# Patient Record
Sex: FEMALE | Race: BLACK OR AFRICAN AMERICAN | Employment: FULL TIME | URBAN - METROPOLITAN AREA
[De-identification: names, ages, dates, MRNs, and addresses within clinical notes are randomized per-mention and may not be internally consistent; named-entity substitution may affect disease eponyms.]

---

## 2013-07-01 LAB — COLONOSCOPY, EXTERNAL: NORMAL

## 2017-04-04 ENCOUNTER — OFFICE VISIT (OUTPATIENT)
Dept: INTERNAL MEDICINE CLINIC | Age: 52
End: 2017-04-04

## 2017-04-04 VITALS
DIASTOLIC BLOOD PRESSURE: 77 MMHG | HEIGHT: 61 IN | SYSTOLIC BLOOD PRESSURE: 107 MMHG | WEIGHT: 184 LBS | BODY MASS INDEX: 34.74 KG/M2 | HEART RATE: 64 BPM | RESPIRATION RATE: 18 BRPM | TEMPERATURE: 98.2 F | OXYGEN SATURATION: 97 %

## 2017-04-04 DIAGNOSIS — R21 RASH: ICD-10-CM

## 2017-04-04 DIAGNOSIS — J30.1 NON-SEASONAL ALLERGIC RHINITIS DUE TO POLLEN: ICD-10-CM

## 2017-04-04 DIAGNOSIS — R73.01 IFG (IMPAIRED FASTING GLUCOSE): ICD-10-CM

## 2017-04-04 DIAGNOSIS — R71.8 MICROCYTOSIS: ICD-10-CM

## 2017-04-04 DIAGNOSIS — I10 ESSENTIAL HYPERTENSION: Primary | ICD-10-CM

## 2017-04-04 DIAGNOSIS — E78.5 DYSLIPIDEMIA: ICD-10-CM

## 2017-04-04 DIAGNOSIS — E55.9 VITAMIN D DEFICIENCY: ICD-10-CM

## 2017-04-04 DIAGNOSIS — R01.1 MURMUR, CARDIAC: ICD-10-CM

## 2017-04-04 RX ORDER — CETIRIZINE HCL 10 MG
TABLET ORAL
Qty: 30 TAB | Refills: 5 | Status: SHIPPED | OUTPATIENT
Start: 2017-04-04 | End: 2018-06-22 | Stop reason: SDUPTHER

## 2017-04-04 NOTE — MR AVS SNAPSHOT
Visit Information Date & Time Provider Department Dept. Phone Encounter #  
 4/4/2017 12:15 PM Trent Wolf, [address] [place] [address],[address] [phone] [id number] Follow-up Instructions Return in about 6 months (around 10/4/2017). Upcoming Health Maintenance Date Due DTaP/Tdap/Td series (1 - Tdap) 6/15/1985 PAP AKA CERVICAL CYTOLOGY 7/14/2017 COLONOSCOPY 1/14/2018 BREAST CANCER SCRN MAMMOGRAM 10/8/2018 Allergies as of 4/4/2017  Review Complete On: 4/4/2017 By: June Al LPN Severity Noted Reaction Type Reactions Lisinopril Medium 04/01/2013   Intolerance Cough Shellfish Derived  02/04/2014    Hives Current Immunizations  Reviewed on 2/4/2014 Name Date Influenza Vaccine PF 11/11/2013 Influenza Vaccine Split 9/7/2012, 1/21/2011 Not reviewed this visit You Were Diagnosed With   
  
 Codes Comments Essential hypertension    -  Primary ICD-10-CM: I10 
ICD-9-CM: 401.9 Microcytosis     ICD-10-CM: R71.8 ICD-9-CM: 790.09 Dyslipidemia     ICD-10-CM: E78.5 ICD-9-CM: 272.4 Non-seasonal allergic rhinitis due to pollen     ICD-10-CM: J30.1 ICD-9-CM: 477.0 IFG (impaired fasting glucose)     ICD-10-CM: R73.01 
ICD-9-CM: 790.21 Vitamin D deficiency     ICD-10-CM: E55.9 ICD-9-CM: 268.9 Rash     ICD-10-CM: R21 
ICD-9-CM: 782.1 Murmur, cardiac     ICD-10-CM: R01.1 ICD-9-CM: 227. 2 Vitals BP Pulse Temp Resp Height(growth percentile) Weight(growth percentile) 107/77 (BP 1 Location: Left arm, BP Patient Position: Sitting) 64 98.2 °F (36.8 °C) (Oral) 18 5' 1\" (1.549 m) 184 lb (83.5 kg) SpO2 BMI OB Status Smoking Status 97% 34.77 kg/m2 Menopause Never Smoker Vitals History BMI and BSA Data Body Mass Index Body Surface Area 34.77 kg/m 2 1.9 m 2 Preferred Pharmacy Pharmacy Name Phone [facility] [address] [facility] [phone] Your Updated Medication List  
  
   
This list is accurate as of: 4/4/17 12:43 PM.  Always use your most recent med list. amLODIPine 10 mg tablet Commonly known as:  Eaton Jairo TAKE ONE TABLET BY MOUTH ONCE DAILY  
  
 cetirizine 10 mg tablet Commonly known as:  ZYRTEC  
TAKE ONE TABLET BY MOUTH ONCE DAILY AT BEDTIME  
  
 losartan-hydroCHLOROthiazide 100-25 mg per tablet Commonly known as:  HYZAAR  
TAKE ONE TABLET BY MOUTH ONCE DAILY  
  
 melatonin 1 mg tablet Take 1 mg by mouth nightly. MULTIPLE VITAMIN PO Take  by mouth. One daily  
  
 triamcinolone acetonide 0.1 % topical cream  
Commonly known as:  KENALOG Apply  to affected area two (2) times a day. use thin layer mon to Friday only bid Prescriptions Sent to Pharmacy Refills  
 cetirizine (ZYRTEC) 10 mg tablet 5 Sig: TAKE ONE TABLET BY MOUTH ONCE DAILY AT BEDTIME Class: Normal  
 Pharmacy: [address] [place] [address] #: [phone] We Performed the Following CBC W/O DIFF [66692 CPT(R)] HEMOGLOBIN A1C WITH EAG [22863 CPT(R)] LIPID PANEL [33905 CPT(R)] METABOLIC PANEL, COMPREHENSIVE [67955 CPT(R)] REFERRAL TO DERMATOLOGY [REF19 Custom] Comments:  
 Please evaluate patient for rash TSH 3RD GENERATION [18193 CPT(R)] Follow-up Instructions Return in about 6 months (around 10/4/2017). To-Do List   
 04/04/2017 ECHO:  2D ECHO COMPLETE ADULT (TTE) W OR WO CONTR Referral Information Referral ID Referred By Referred To  
  
 [id number] Landy Rodriguez Not Available Visits Status Start Date End Date 1 New Request 4/4/17 4/4/18 If your referral has a status of pending review or denied, additional information will be sent to support the outcome of this decision. Introducing [facility]!    
 [employer] introduces [employer]k patient portal. Now you can access parts of your medical record, email your doctor's office, and request medication refills online. 1. In your internet browser, go to [email or web address] 2. Click on the First Time User? Click Here link in the Sign In box. You will see the New Member Sign Up page. 3. Enter your Barburrito Access Code exactly as it appears below. You will not need to use this code after youve completed the sign-up process. If you do not sign up before the expiration date, you must request a new code. · Barbur[employer] Access Code: [email or web address] Expires: 7/3/2017 12:43 PM 
 
4. Enter the last four digits of your Social Security Number (xxxx) and Date of Birth (mm/dd/yyyy) as indicated and click Submit. You will be taken to the next sign-up page. 5. Create a Barburrito ID. This will be your Barburrito login ID and cannot be changed, so think of one that is secure and easy to remember. 6. Create a Barburrito password. You can change your password at any time. 7. Enter your Password Reset Question and Answer. This can be used at a later time if you forget your password. 8. Enter your e-mail address. You will receive e-mail notification when new information is available in [address] 9. Click Sign Up. You can now view and download portions of your medical record. 10. Click the Download Summary menu link to download a portable copy of your medical information. If you have questions, please visit the Frequently Asked Questions section of the Barbur[employer] website. Remember, Barburrito is NOT to be used for urgent needs. For medical emergencies, dial 911. Now available from your iPhone and Android! Please provide this summary of care documentation to your next provider. Your primary care clinician is listed as Denice Celeste. If you have any questions after today's visit, please call [phone].

## 2017-04-04 NOTE — PROGRESS NOTES
HISTORY OF PRESENT ILLNESS  Rafael Campbell is a 45 y.o. female. HPI   Last here 8/19/16. Pt is here to f/u on chronic conditions  Pt is overdue for follow up  Pt is not fasting today    BP today is great- 107/77  BP at home is \"good\" per pt   Continues norvasc 10mg and losartan-HCTZ 100-25mg daily    Reviewed last labs 8/16  Vit D low, mild anemia  Repeat labs fasting this week     Pt went to Dr. 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 Galileo Dailey in two weeks    Wt is down 6 lbs since last visit   Congratulated her on this  Discussed diet and continued weight loss     Continues zyrtec daily for allergies  Pt has been getting hives frequently  The zyrtec helps improve her hives/rash  She thinks d/t irritants in work environment     Pt has eczema patch to her R shin   She states this has not been improving  She has been using triamcinolone but this spot continues to spread  Discussed f/u with dermatology at this time  Provided her with referral today, she plans to see Dr. Ilda Jovel (derm)    Reviewed mammogram 10/08/16: negative    Continues vit D 2000 units daily OTC     Pt has seen Dr. 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 Still can hear her murmur, ordered this again 4/17.  She will complete this      PREVENTIVE:    Colonoscopy: 7/01/13, Dr. Mickey Reyes, repeat 5 years  Pap: [facility],   Mammogram: 10/08/16 negative  DEXA: not yet needed  Tdap:     Pneumovax: not yet needed  Clusuks48: not yet needed  Zostavax: not yet needed  Flu shot: declines this year   A1c:  5.8, 1/15 5.9,  5.7,  6.0  Eye exam: [facility], 3/15, due, will schedule  Hep C screen: 1/15 negative  EK16, ECHO ordered   Lipids:  ,  ordered         Patient Active Problem List    Diagnosis Date Noted    Murmur 2011    Allergic rhinitis 2010    Hypertension 10/02/2009    Microcytosis 10/02/2009     Current Outpatient Prescriptions   Medication Sig Dispense Refill    losartan-hydroCHLOROthiazide (HYZAAR) 100-25 mg per tablet TAKE ONE TABLET BY MOUTH ONCE DAILY 30 Tab 3    amLODIPine (NORVASC) 10 mg tablet TAKE ONE TABLET BY MOUTH ONCE DAILY 30 Tab 4    triamcinolone acetonide (KENALOG) 0.1 % topical cream Apply  to affected area two (2) times a day. use thin layer mon to Friday only bid 30 g 0    cetirizine (ZYRTEC) 10 mg tablet TAKE ONE TABLET BY MOUTH ONCE DAILY AT BEDTIME 30 Tab 5    melatonin 1 mg tablet Take 1 mg by mouth nightly.  MULTIVITAMINS (MULTIPLE VITAMIN PO) Take  by mouth.  One daily        Past Surgical History:   Procedure Laterality Date    ENDOSCOPY, COLON, DIAGNOSTIC      HX GYN      tubal ligation      Lab Results  Component Value Date/Time   WBC 9.3 2016 10:32 AM   HGB 10.4 2016 10:32 AM   HCT 35.2 2016 10:32 AM   PLATELET 624  10:32 AM   MCV 74 2016 10:32 AM       Lab Results  Component Value Date/Time   Cholesterol, total 199 2016 10:26 AM   HDL Cholesterol 71 2016 10:26 AM   LDL, calculated 107 2016 10:26 AM   Triglyceride 103 2016 10:26 AM       Lab Results  Component Value Date/Time   GFR est AA 91 2016 10:32 AM   GFR est non-AA 79 2016 10:32 AM   Creatinine 0.85 2016 10:32 AM   BUN 10 2016 10:32 AM   Sodium 144 2016 10:32 AM   Potassium 4.3 08/19/2016 10:32 AM   Chloride 101 08/19/2016 10:32 AM   CO2 26 08/19/2016 10:32 AM         Review of Systems   Respiratory: Negative for shortness of breath. Cardiovascular: Negative for chest pain. Skin: Positive for rash. Endo/Heme/Allergies: Positive for environmental allergies. Physical Exam   Constitutional: She is oriented to person, place, and time. She appears well-developed and well-nourished. No distress. HENT:   Head: Normocephalic and atraumatic. Right Ear: External ear normal.   Mouth/Throat: Oropharynx is clear and moist. No oropharyngeal exudate. Mild cerumen L ear   Eyes: Conjunctivae and EOM are normal. Right eye exhibits no discharge. Left eye exhibits no discharge. Neck: Normal range of motion. Neck supple. Cardiovascular: Normal rate, regular rhythm and intact distal pulses. Exam reveals no gallop and no friction rub. Murmur heard. Pulmonary/Chest: Effort normal and breath sounds normal. No respiratory distress. She has no wheezes. She has no rales. She exhibits no tenderness. Musculoskeletal: Normal range of motion. She exhibits no edema, tenderness or deformity. Lymphadenopathy:     She has no cervical adenopathy. Neurological: She is alert and oriented to person, place, and time. Coordination normal.   Skin: Skin is warm and dry. Rash noted. She is not diaphoretic. No erythema. No pallor. Large purplish colored plaque R calf   Psychiatric: She has a normal mood and affect. Her behavior is normal.       ASSESSMENT and PLAN    ICD-10-CM ICD-9-CM    1. Essential hypertension    BP well controlled on norvasc 10mg and losartan HCTZ 20-25mg. Continue no change to dose. I10 401.9 CBC W/O DIFF      HEMOGLOBIN A1C WITH EAG      METABOLIC PANEL, COMPREHENSIVE      LIPID PANEL      TSH 3RD GENERATION   2.  Microcytosis    Chronic, will get old notes from Dr. Huma Ordonez for review, repeat CBC today R71.8 790.09 CBC W/O DIFF      HEMOGLOBIN A1C WITH EAG      METABOLIC PANEL, COMPREHENSIVE      LIPID PANEL      TSH 3RD GENERATION   3. Dyslipidemia    Diet controlled, wt improved, repeat lipids E78.5 272.4 CBC W/O DIFF      HEMOGLOBIN A1C WITH EAG      METABOLIC PANEL, COMPREHENSIVE      LIPID PANEL      TSH 3RD GENERATION   4. Non-seasonal allergic rhinitis due to pollen    Improved with zyrtec, continue. J30.1 477.0 CBC W/O DIFF      HEMOGLOBIN A1C WITH EAG      METABOLIC PANEL, COMPREHENSIVE      LIPID PANEL      TSH 3RD GENERATION   5. IFG (impaired fasting glucose)    Wt improved, a1c should be improved as well, repeat fasting labs, continue with diet, no medications needed. R73.01 790.21 CBC W/O DIFF      HEMOGLOBIN A1C WITH EAG      METABOLIC PANEL, COMPREHENSIVE      LIPID PANEL      TSH 3RD GENERATION   6. Vitamin D deficiency    Taking OTC 2000 units per day, repeat level   E55.9 268.9    7. Rash    Refer back to dermatology, not improved with triamcinolone   R21 782.1 REFERRAL TO DERMATOLOGY   8. Murmur, cardiac    Check ECHO R01.1 785.2 2D ECHO COMPLETE ADULT (TTE) W OR WO CONTR            Written by Geeta Branch, as dictated by Bradford Wood MD.    Current diagnosis and concerns discussed with pt at length. Understands risks and benefits or current treatment plan and medications and accepts the treatment and medication with any possible risks.   Pt asks appropriate questions which were answered.   Pt instructed to call with any concerns or problems.

## 2017-04-18 ENCOUNTER — HOSPITAL ENCOUNTER (OUTPATIENT)
Dept: NON INVASIVE DIAGNOSTICS | Age: 52
Discharge: HOME | End: 2017-04-18
Attending: INTERNAL MEDICINE
Payer: COMMERCIAL

## 2017-04-18 DIAGNOSIS — R01.1 MURMUR, CARDIAC: ICD-10-CM

## 2017-04-18 PROCEDURE — 93306 TTE W/DOPPLER COMPLETE: CPT

## 2017-04-20 NOTE — PROGRESS NOTES
Called, spoke to pt. Two pt identifiers confirmed. Pt informed per Dr. Krystal Knapp echo shows normal heart function and valves. Pt verbalized understanding of information discussed w/ no further questions at this time.

## 2017-10-16 ENCOUNTER — OFFICE VISIT (OUTPATIENT)
Dept: INTERNAL MEDICINE CLINIC | Age: 52
End: 2017-10-16

## 2017-10-16 VITALS
TEMPERATURE: 97.9 F | BODY MASS INDEX: 34.74 KG/M2 | RESPIRATION RATE: 16 BRPM | HEART RATE: 70 BPM | DIASTOLIC BLOOD PRESSURE: 62 MMHG | WEIGHT: 184 LBS | OXYGEN SATURATION: 96 % | HEIGHT: 61 IN | SYSTOLIC BLOOD PRESSURE: 100 MMHG

## 2017-10-16 DIAGNOSIS — R73.01 IFG (IMPAIRED FASTING GLUCOSE): ICD-10-CM

## 2017-10-16 DIAGNOSIS — I10 ESSENTIAL HYPERTENSION: ICD-10-CM

## 2017-10-16 DIAGNOSIS — Z00.00 PHYSICAL EXAM, ANNUAL: Primary | ICD-10-CM

## 2017-10-16 NOTE — MR AVS SNAPSHOT
Visit Information Date & Time Provider Department Dept. Phone Encounter #  
 10/16/2017  9:00 AM Zoe Colunga, [address] [place] [address],[address] [phone] [id number] Follow-up Instructions Return in about 6 months (around 4/16/2018). Upcoming Health Maintenance Date Due DTaP/Tdap/Td series (1 - Tdap) 6/15/1985 COLONOSCOPY 1/14/2018 BREAST CANCER SCRN MAMMOGRAM 10/8/2018 PAP AKA CERVICAL CYTOLOGY 9/12/2019 Allergies as of 10/16/2017  Review Complete On: 4/4/2017 By: Zoe Colunga MD  
  
 Severity Noted Reaction Type Reactions Lisinopril Medium 04/01/2013   Intolerance Cough Shellfish Derived  02/04/2014    Hives Current Immunizations  Reviewed on 2/4/2014 Name Date Influenza Vaccine PF 11/11/2013 Influenza Vaccine Split 9/7/2012, 1/21/2011 Tdap 1/1/2010 Not reviewed this visit You Were Diagnosed With   
  
 Codes Comments Physical exam, annual    -  Primary ICD-10-CM: Z00.00 ICD-9-CM: V70.0 Essential hypertension     ICD-10-CM: I10 
ICD-9-CM: 401.9 IFG (impaired fasting glucose)     ICD-10-CM: R73.01 
ICD-9-CM: 790.21 Vitals OB Status Smoking Status Menopause Never Smoker Preferred Pharmacy Pharmacy Name Phone [facility] [address] [place] [address] [phone] Your Updated Medication List  
  
   
This list is accurate as of: 10/16/17  9:20 AM.  Always use your most recent med list. amLODIPine 10 mg tablet Commonly known as:  Horris Bills TAKE ONE TABLET BY MOUTH ONCE DAILY  
  
 cetirizine 10 mg tablet Commonly known as:  ZYRTEC  
TAKE ONE TABLET BY MOUTH ONCE DAILY AT BEDTIME  
  
 losartan-hydroCHLOROthiazide 100-25 mg per tablet Commonly known as:  HYZAAR  
TAKE ONE TABLET BY MOUTH ONCE DAILY  
  
 melatonin 1 mg tablet Take 1 mg by mouth nightly. MULTIPLE VITAMIN PO Take  by mouth. One daily triamcinolone acetonide 0.1 % topical cream  
Commonly known as:  KENALOG Apply  to affected area two (2) times a day. use thin layer mon to Friday only bid We Performed the Following CBC W/O DIFF [79145 CPT(R)] HEMOGLOBIN A1C WITH EAG [45802 CPT(R)] LIPID PANEL [50775 CPT(R)] METABOLIC PANEL, COMPREHENSIVE [48738 CPT(R)] TSH 3RD GENERATION [40346 CPT(R)] VITAMIN D, 25 HYDROXY [id number] CPT(R)] Follow-up Instructions Return in about 6 months (around 4/16/2018). To-Do List   
 10/16/2017 Imaging:  [facility] BI SCREENING INCL CAD Patient Instructions Schedule eye exam and mammogram 
 
Labs today And gyn Introducing [facility]! [facility] introduces DailyTicket patient portal. Now you can access parts of your medical record, email your doctor's office, and request medication refills online. 1. In your internet browser, go to [email or web address] 2. Click on the First Time User? Click Here link in the Sign In box. You will see the New Member Sign Up page. 3. Enter your DailyTicket Access Code exactly as it appears below. You will not need to use this code after youve completed the sign-up process. If you do not sign up before the expiration date, you must request a new code. · DailyTicket Access Code: [email or web address] Expires: 1/14/2018  9:20 AM 
 
4. Enter the last four digits of your Social Security Number (xxxx) and Date of Birth (mm/dd/yyyy) as indicated and click Submit. You will be taken to the next sign-up page. 5. Create a DailyTicket ID. This will be your DailyTicket login ID and cannot be changed, so think of one that is secure and easy to remember. 6. Create a DailyTicket password. You can change your password at any time. 7. Enter your Password Reset Question and Answer. This can be used at a later time if you forget your password. 8. Enter your e-mail address.  You will receive e-mail notification when new information is available in [employer]. 9. Click Sign Up. You can now view and download portions of your medical record. 10. Click the Download Summary menu link to download a portable copy of your medical information. If you have questions, please visit the Frequently Asked Questions section of the [employer] website. Remember, [employer] is NOT to be used for urgent needs. For medical emergencies, dial 911. Now available from your iPhone and Android! Please provide this summary of care documentation to your next provider. Your primary care clinician is listed as [facility]. If you have any questions after today's visit, please call [phone].

## 2017-10-16 NOTE — PROGRESS NOTES
HISTORY OF PRESENT ILLNESS  Alber Lagunas is a 47 y.o. female. HPI   Last here 17. Pt is here to f/u on chronic conditions. Pt is not fasting today.     BP today is 100/62  BP at home running around 140s/70s in the PM and 117/70 in the AM  Continues on norvasc 10mg and losartan-HCTZ 100-25mg daily    Wt is stable since last visit   Pt is walking her new dog regularly  Pt is drinking sweet teas regularly   Stopped drinking sodas  Discussed reducing caloric beverage intake  Discussed diet and weight loss     Pt c/o R shoulder and hip pain d/t arthritis  Pt is walking with her new dog     Will get labs today     Pt follows with Dr. Terrell Douglas (ENT) prn  Last visit was 2017  Per pt, pt has a \"long uvula\"  Pt was told that surgery is not recommended at this time  Pt was told to gargle with salt water  Will see him prn    Continues on zyrtec daily for allergies     Continues on vit D 2000 units daily OTC      Pt previously followed with Dr. 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 Still can hear her murmur, ordered this again .  She will complete this    PREVENTIVE:    Colonoscopy: 13, Dr. Joe Llamas, repeat 5 years  Pap: [facility], , at least every other year, due, advised to schedule  Mammogram: 10/08/16 negative, due 10/17, ordered  DEXA: not yet needed  Tdap:     Pneumovax: not yet needed  [facility]: not yet needed  Zostavax: not yet needed  Flu shot: declines    A1c:  5.8, 1/15 5.9,  5.7,  6.0  Eye exam: [facility], 3/15, due, advised to schedule  Hep C screen: 1/15 negative  EK16, ECHO in 2017  Lipids: 2/16        Patient Active Problem List    Diagnosis Date Noted    Murmur 05/27/2011    Allergic rhinitis 02/05/2010    Hypertension 10/02/2009    Microcytosis 10/02/2009     Current Outpatient Prescriptions   Medication Sig Dispense Refill    losartan-hydroCHLOROthiazide (HYZAAR) 100-25 mg per tablet TAKE ONE TABLET BY MOUTH ONCE DAILY 30 Tab 6    amLODIPine (NORVASC) 10 mg tablet TAKE ONE TABLET BY MOUTH ONCE DAILY 30 Tab 6    cetirizine (ZYRTEC) 10 mg tablet TAKE ONE TABLET BY MOUTH ONCE DAILY AT BEDTIME 30 Tab 5    triamcinolone acetonide (KENALOG) 0.1 % topical cream Apply  to affected area two (2) times a day. use thin layer mon to Friday only bid 30 g 0    melatonin 1 mg tablet Take 1 mg by mouth nightly.  MULTIVITAMINS (MULTIPLE VITAMIN PO) Take  by mouth. One daily        Past Surgical History:   Procedure Laterality Date    ENDOSCOPY, COLON, DIAGNOSTIC      HX GYN      tubal ligation      Lab Results  Component Value Date/Time   WBC 9.3 08/19/2016 10:32 AM   HGB 10.4 08/19/2016 10:32 AM   HCT 35.2 08/19/2016 10:32 AM   PLATELET 340 80/23/7543 10:32 AM   MCV 74 08/19/2016 10:32 AM     Lab Results  Component Value Date/Time   Cholesterol, total 199 02/16/2016 10:26 AM   HDL Cholesterol 71 02/16/2016 10:26 AM   LDL, calculated 107 02/16/2016 10:26 AM   Triglyceride 103 02/16/2016 10:26 AM     Lab Results  Component Value Date/Time   GFR est non-AA 79 08/19/2016 10:32 AM   GFR est AA 91 08/19/2016 10:32 AM   Creatinine 0.85 08/19/2016 10:32 AM   BUN 10 08/19/2016 10:32 AM   Sodium 144 08/19/2016 10:32 AM   Potassium 4.3 08/19/2016 10:32 AM   Chloride 101 08/19/2016 10:32 AM   CO2 26 08/19/2016 10:32 AM          Review of Systems   Respiratory: Negative for shortness of breath. Cardiovascular: Negative for chest pain. Physical Exam   Constitutional: She is oriented to person, place, and time. She appears well-developed and well-nourished. No distress.    HENT:   Head: Normocephalic and atraumatic. Right Ear: External ear normal.   Left Ear: External ear normal.   Mouth/Throat: Oropharynx is clear and moist. No oropharyngeal exudate. Eyes: Conjunctivae and EOM are normal. Pupils are equal, round, and reactive to light. Right eye exhibits no discharge. Left eye exhibits no discharge. No scleral icterus. Neck: Normal range of motion. Neck supple. No thyromegaly present. No carotid bruits     Cardiovascular: Normal rate, regular rhythm and intact distal pulses. Exam reveals no gallop and no friction rub. Murmur (Tiny, R-sternal border) heard. Pulmonary/Chest: Effort normal and breath sounds normal. No respiratory distress. She has no wheezes. She has no rales. She exhibits no tenderness. Abdominal: Soft. She exhibits no distension and no mass. There is no tenderness. There is no rebound and no guarding. Musculoskeletal: Normal range of motion. She exhibits no edema, tenderness or deformity. Lymphadenopathy:     She has no cervical adenopathy. Neurological: She is alert and oriented to person, place, and time. She has normal reflexes. She exhibits normal muscle tone. Coordination normal.   Skin: Skin is warm and dry. No rash noted. She is not diaphoretic. No erythema. No pallor. Psychiatric: She has a normal mood and affect. Her behavior is normal.       ASSESSMENT and PLAN    ICD-10-CM ICD-9-CM    1. Physical exam, annual    Discussed diet and weight loss, due for labs, she will go today, eye exam, mammogram and pelvic are all due, COLO in the Summer Z00.00 V70.0 LIPID PANEL      METABOLIC PANEL, COMPREHENSIVE      CBC W/O DIFF      HEMOGLOBIN A1C WITH EAG      TSH 3RD GENERATION      VITAMIN D, 25 HYDROXY      [facility] BI SCREENING INCL CAD   2. Essential hypertension    Well-controlled on norvasc and losartan-HCTZ, no orthostatic sx, continue current dosing   I10 401.9    3.  IFG (impaired fasting glucose)    Due for a1c, wt is not improved, discussed need for w/l, she needs to cut out sweet teas, she is working on increasing her exercise as she has a new dog R73.01 790.21         Scribed by Toma Abraham of [address] [place] [address] [address], as dictated by Dr. Diogo Kimball. Current diagnosis and concerns discussed with pt at length. Pt understands risks and benefits or current treatment plan and medications, and accepts the treatment and medication with any possible risks. Pt asks appropriate questions, which were answered. Pt was instructed to call with any concerns or problems. This note will not be viewable in [address]

## 2017-10-17 LAB
25(OH)D3+25(OH)D2 SERPL-MCNC: 28.4 NG/ML (ref 30–100)
ALBUMIN SERPL-MCNC: 4.4 G/DL (ref 3.5–5.5)
ALBUMIN/GLOB SERPL: 1.5 {RATIO} (ref 1.2–2.2)
ALP SERPL-CCNC: 86 IU/L (ref 39–117)
ALT SERPL-CCNC: 16 IU/L (ref 0–32)
AST SERPL-CCNC: 20 IU/L (ref 0–40)
BILIRUB SERPL-MCNC: 0.4 MG/DL (ref 0–1.2)
BUN SERPL-MCNC: 12 MG/DL (ref 6–24)
BUN/CREAT SERPL: 14 (ref 9–23)
CALCIUM SERPL-MCNC: 9.7 MG/DL (ref 8.7–10.2)
CHLORIDE SERPL-SCNC: 98 MMOL/L (ref 96–106)
CHOLEST SERPL-MCNC: 190 MG/DL (ref 100–199)
CO2 SERPL-SCNC: 25 MMOL/L (ref 18–29)
CREAT SERPL-MCNC: 0.85 MG/DL (ref 0.57–1)
ERYTHROCYTE [DISTWIDTH] IN BLOOD BY AUTOMATED COUNT: 16.9 % (ref 12.3–15.4)
EST. AVERAGE GLUCOSE BLD GHB EST-MCNC: 111 MG/DL
GLOBULIN SER CALC-MCNC: 3 G/DL (ref 1.5–4.5)
GLUCOSE SERPL-MCNC: 110 MG/DL (ref 65–99)
HBA1C MFR BLD: 5.5 % (ref 4.8–5.6)
HCT VFR BLD AUTO: 35.6 % (ref 34–46.6)
HDLC SERPL-MCNC: 60 MG/DL
HGB BLD-MCNC: 11.4 G/DL (ref 11.1–15.9)
LDLC SERPL CALC-MCNC: 111 MG/DL (ref 0–99)
MCH RBC QN AUTO: 22.7 PG (ref 26.6–33)
MCHC RBC AUTO-ENTMCNC: 32 G/DL (ref 31.5–35.7)
MCV RBC AUTO: 71 FL (ref 79–97)
PLATELET # BLD AUTO: 415 X10E3/UL (ref 150–379)
POTASSIUM SERPL-SCNC: 3.9 MMOL/L (ref 3.5–5.2)
PROT SERPL-MCNC: 7.4 G/DL (ref 6–8.5)
RBC # BLD AUTO: 5.03 X10E6/UL (ref 3.77–5.28)
SODIUM SERPL-SCNC: 139 MMOL/L (ref 134–144)
TRIGL SERPL-MCNC: 93 MG/DL (ref 0–149)
TSH SERPL DL<=0.005 MIU/L-ACNC: 2.5 UIU/ML (ref 0.45–4.5)
VLDLC SERPL CALC-MCNC: 19 MG/DL (ref 5–40)
WBC # BLD AUTO: 8.4 X10E3/UL (ref 3.4–10.8)

## 2017-11-07 ENCOUNTER — HOSPITAL ENCOUNTER (OUTPATIENT)
Dept: MAMMOGRAPHY | Age: 52
Discharge: HOME | End: 2017-11-07
Attending: INTERNAL MEDICINE
Payer: COMMERCIAL

## 2017-11-07 DIAGNOSIS — Z00.00 PHYSICAL EXAM, ANNUAL: ICD-10-CM

## 2017-11-07 PROCEDURE — 77067 SCR MAMMO BI INCL CAD: CPT

## 2017-11-10 ENCOUNTER — HOSPITAL ENCOUNTER (OUTPATIENT)
Dept: ULTRASOUND IMAGING | Age: 52
Discharge: HOME | End: 2017-11-10
Attending: INTERNAL MEDICINE
Payer: COMMERCIAL

## 2017-11-10 ENCOUNTER — HOSPITAL ENCOUNTER (OUTPATIENT)
Dept: MAMMOGRAPHY | Age: 52
Discharge: HOME | End: 2017-11-10
Attending: INTERNAL MEDICINE
Payer: COMMERCIAL

## 2017-11-10 DIAGNOSIS — R92.8 ABNORMAL MAMMOGRAM OF RIGHT BREAST: ICD-10-CM

## 2017-11-10 PROCEDURE — 77065 DX MAMMO INCL CAD UNI: CPT

## 2017-11-10 PROCEDURE — 76642 ULTRASOUND BREAST LIMITED: CPT

## 2018-03-10 RX ORDER — AMLODIPINE BESYLATE 10 MG/1
TABLET ORAL
Qty: 30 TAB | Refills: 6 | Status: SHIPPED | OUTPATIENT
Start: 2018-03-10 | End: 2018-11-05 | Stop reason: SDUPTHER

## 2018-03-24 RX ORDER — LOSARTAN POTASSIUM AND HYDROCHLOROTHIAZIDE 25; 100 MG/1; MG/1
TABLET ORAL
Qty: 30 TAB | Refills: 6 | Status: SHIPPED | OUTPATIENT
Start: 2018-03-24 | End: 2018-10-23 | Stop reason: SDUPTHER

## 2018-04-16 ENCOUNTER — OFFICE VISIT (OUTPATIENT)
Dept: INTERNAL MEDICINE CLINIC | Age: 53
End: 2018-04-16

## 2018-04-16 VITALS
TEMPERATURE: 98.1 F | OXYGEN SATURATION: 96 % | RESPIRATION RATE: 16 BRPM | HEART RATE: 73 BPM | SYSTOLIC BLOOD PRESSURE: 121 MMHG | DIASTOLIC BLOOD PRESSURE: 79 MMHG | BODY MASS INDEX: 36.06 KG/M2 | WEIGHT: 191 LBS | HEIGHT: 61 IN

## 2018-04-16 DIAGNOSIS — E78.5 DYSLIPIDEMIA: ICD-10-CM

## 2018-04-16 DIAGNOSIS — R73.01 IFG (IMPAIRED FASTING GLUCOSE): ICD-10-CM

## 2018-04-16 DIAGNOSIS — I10 ESSENTIAL HYPERTENSION: Primary | ICD-10-CM

## 2018-04-16 DIAGNOSIS — R71.8 MICROCYTOSIS: ICD-10-CM

## 2018-04-16 DIAGNOSIS — M54.41 CHRONIC BILATERAL LOW BACK PAIN WITH BILATERAL SCIATICA: ICD-10-CM

## 2018-04-16 DIAGNOSIS — R92.8 ABNORMALITY OF LEFT BREAST ON SCREENING MAMMOGRAM: ICD-10-CM

## 2018-04-16 DIAGNOSIS — Z12.11 COLON CANCER SCREENING: ICD-10-CM

## 2018-04-16 DIAGNOSIS — G89.29 CHRONIC BILATERAL LOW BACK PAIN WITH BILATERAL SCIATICA: ICD-10-CM

## 2018-04-16 DIAGNOSIS — E66.9 OBESITY (BMI 30.0-34.9): ICD-10-CM

## 2018-04-16 DIAGNOSIS — M54.42 CHRONIC BILATERAL LOW BACK PAIN WITH BILATERAL SCIATICA: ICD-10-CM

## 2018-04-16 DIAGNOSIS — M54.42 ACUTE BACK PAIN WITH SCIATICA, LEFT: Primary | ICD-10-CM

## 2018-04-16 RX ORDER — GABAPENTIN 100 MG/1
100 CAPSULE ORAL 3 TIMES DAILY
Qty: 30 CAP | Refills: 1 | Status: SHIPPED | OUTPATIENT
Start: 2018-04-16 | End: 2018-10-30 | Stop reason: SDUPTHER

## 2018-04-16 NOTE — PROGRESS NOTES
HISTORY OF PRESENT ILLNESS  Michelle Riedel is a 47 y.o. female. HPI   Last here 10/16/17. Pt is here to f/u on chronic conditions.      BP today is 121/79  Continues on norvasc 10mg and losartan-HCTZ 100-25mg daily     Wt is up 7 lbs since last visit   Pt walks her dog twice a day  Pt is no longer drinking sodas  Pt is still drinking 3-4 containers/day of sweet tea   Pt wonders if she can drink tea sweetened with agave instead   Discussed decreasing caloric beverage and increasing water intake      Reviewed labs 10/17: low vit D  Will get labs today     Reviewed mammo 11/17: 1. Presumed isoechoic lymph node in the right breast. No mammographic or ultrasound evidence for malignancy. 2. A six-month follow-up right breast mammogram is recommended to document stability 3. The patient has been notified of these results and recommendations. 4. BI-RADS Category 3, probably benign, short-term follow-up recommended. Reviewed US L breast 11/17: 1. Presumed isoechoic lymph node in the right breast. No mammographic or ultrasound evidence for malignancy. 2. A six-month follow-up right breast mammogram is recommended to document stability 3. The patient has been notified of these results and recommendations. 4. BI-RADS Category 3, probably benign, short-term follow-up recommended.   Discussed 6 month f/u     Pt c/o back pain, radiating to her BLE (mainly to her RLE) x 4 years - worsening   Pt states that her sx exacerbate when sitting down qhs  Pt tried OTC meds with no improvement to sx  Pt used her son's motrin 800mg with improvement to xs  Pt would like an XR L spine  Ordered XR L spine  Ordered gabapentin qhs  Provided her with exercises to complete at home  If her sx progress, would send her to PT    Pt follows with Dr. Parvin Crawley (ENT) prn  Last visit was 4/2017  Pt will only f/u with this physician prn     Pt previously followed with Dr. 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 Kalpana Mercado (derm)  Pt was provided with a cream, which improved her dry skin     Continues on zyrtec daily for allergies      Continues on vit D OTC 2000U daily     Pt wonders if she needs immunizations before going to [facility]  Pt has had a Hep A series in the past      Recall ECHO 17: heart squeeze nl, no MR     PREVENTIVE:    Colonoscopy: 13, Dr. Vipul Weinstein, repeat 5 years, due , referred  Pap: Dr. Lorena Cummings, , at least every other year   Mammogram: , mammo and US L breast, repeat in 6 months, due , ordered   DEXA: not yet needed  Tdap:     Pneumovax: not yet needed  Daaitaq86: not yet needed  Zostavax: not yet needed  Flu shot: declines    A1c:  5.8, 1/15 5.9,  5.7,  6.0, 10/17 5.5  Eye exam: [facility], 3/15, due, advised to schedule, reminded   Hep C screen: 1/15, negative  EK16, nsr   Lipids: 10/17     Patient Active Problem List    Diagnosis Date Noted    Murmur 2011    Allergic rhinitis 2010    Hypertension 10/02/2009    Microcytosis 10/02/2009     Current Outpatient Prescriptions   Medication Sig Dispense Refill    losartan-hydroCHLOROthiazide (HYZAAR) 100-25 mg per tablet TAKE ONE TABLET BY MOUTH ONCE DAILY 30 Tab 6    amLODIPine (NORVASC) 10 mg tablet TAKE ONE TABLET BY MOUTH ONCE DAILY 30 Tab 6    cetirizine (ZYRTEC) 10 mg tablet TAKE ONE TABLET BY MOUTH ONCE DAILY AT BEDTIME 30 Tab 5    triamcinolone acetonide (KENALOG) 0.1 % topical cream Apply  to affected area two (2) times a day. use thin layer mon to Friday only bid 30 g 0    melatonin 1 mg tablet Take 1 mg by mouth nightly.  MULTIVITAMINS (MULTIPLE VITAMIN PO) Take  by mouth.  One daily        Past Surgical History:   Procedure Laterality Date    ENDOSCOPY, COLON, DIAGNOSTIC      HX GYN      tubal ligation      Lab Results  Component Value Date/Time   WBC 8.4 10/16/2017 09:30 AM   HGB 11.4 10/16/2017 09:30 AM   HCT 35.6 10/16/2017 09:30 AM   PLATELET 564 (H) 22/95/2833 09:30 AM   MCV 71 (L) 10/16/2017 09:30 AM     Lab Results  Component Value Date/Time   Cholesterol, total 190 10/16/2017 09:30 AM   HDL Cholesterol 60 10/16/2017 09:30 AM   LDL, calculated 111 (H) 10/16/2017 09:30 AM   Triglyceride 93 10/16/2017 09:30 AM     Lab Results  Component Value Date/Time   GFR est non-AA 78 10/16/2017 09:30 AM   GFR est AA 90 10/16/2017 09:30 AM   Creatinine 0.85 10/16/2017 09:30 AM   BUN 12 10/16/2017 09:30 AM   Sodium 139 10/16/2017 09:30 AM   Potassium 3.9 10/16/2017 09:30 AM   Chloride 98 10/16/2017 09:30 AM   CO2 25 10/16/2017 09:30 AM        Review of Systems   Respiratory: Negative for shortness of breath. Cardiovascular: Negative for chest pain. Musculoskeletal: Positive for back pain. Physical Exam   Constitutional: She is oriented to person, place, and time. She appears well-developed and well-nourished. No distress. HENT:   Head: Normocephalic and atraumatic. Mouth/Throat: Oropharynx is clear and moist. No oropharyngeal exudate. Eyes: Conjunctivae and EOM are normal. Right eye exhibits no discharge. Left eye exhibits no discharge. Neck: Normal range of motion. Neck supple. Cardiovascular: Normal rate, regular rhythm and normal heart sounds. Exam reveals no gallop and no friction rub. No murmur heard. Pulmonary/Chest: Effort normal and breath sounds normal. No respiratory distress. She has no wheezes. She has no rales. She exhibits no tenderness. Musculoskeletal: Normal range of motion. She exhibits tenderness (Mild over R lower spine). She exhibits no edema or deformity. Negative SLR BLE  5/5 strength BLE   Lymphadenopathy:     She has no cervical adenopathy. Neurological: She is alert and oriented to person, place, and time. Coordination normal.   Skin: Skin is warm and dry. No rash noted. She is not diaphoretic. No erythema. No pallor.    Psychiatric: She has a normal mood and affect. Her behavior is normal.       ASSESSMENT and PLAN    ICD-10-CM ICD-9-CM    1. Essential hypertension    Controled on norvasc and losartan-HCTZ, continue, no change to dose    F74 422.5 METABOLIC PANEL, COMPREHENSIVE      HEMOGLOBIN A1C WITH EAG   2. Microcytosis    Prev followed with Dr. Jessica Maza, CBCs have been stable, will monitor    V74.1 708.08 METABOLIC PANEL, COMPREHENSIVE      HEMOGLOBIN A1C WITH EAG   3. IFG (impaired fasting glucose)    Check a1c, last a1c was nl, continue with diet and w/l   W88.66 113.86 METABOLIC PANEL, COMPREHENSIVE      HEMOGLOBIN A1C WITH EAG   4. Dyslipidemia    Diet-controled, continue with diet and w/l   P80.4 100.4 METABOLIC PANEL, COMPREHENSIVE      HEMOGLOBIN A1C WITH EAG   5. Obesity (BMI 30.0-34. 9)    Discussed diet and w/l, pt has been drinking a significant amount of sweet tea each day, needs to decrease sweet tea consumption   N83.0 365.48 METABOLIC PANEL, COMPREHENSIVE      HEMOGLOBIN A1C WITH EAG   6. Abnormality of left breast on screening mammogram    Needs a repeat mammo next month, ordered   R92.8 793.80 [facility] LT DX INCL CAD      7. Low back pain - ordered XR L spine, ordered gabapentin qhs, provided her with exercises to complete at home, if her sx progress would send her to PT or perhaps complete an MRI L spine    8. Colon cancer screening - screen    Depression screen reviewed and negative. Scribed by Esthela Kellogg of [address] [place] [address] [address], as dictated by Dr. Yakov Quarles. Current diagnosis and concerns discussed with pt at length. Pt understands risks and benefits or current treatment plan and medications, and accepts the treatment and medication with any possible risks. Pt asks appropriate questions, which were answered. Pt was instructed to call with any concerns or problems. This note will not be viewable in [address]

## 2018-04-16 NOTE — LETTER
10/31/2018 6:48 AM 
 
Ms. Zain Van [address] [place] [id number] Dear Zain Van: 
 
Please find your most recent results below. Resulted Orders METABOLIC PANEL, COMPREHENSIVE Result Value Ref Range Glucose 106 (H) 65 - 99 mg/dL BUN 13 6 - 24 mg/dL Creatinine 0.80 0.57 - 1.00 mg/dL GFR est non-AA 84 >59 mL/min/1.73 GFR est AA 97 >59 mL/min/1.73  
 BUN/Creatinine ratio 16 9 - 23 Sodium 142 134 - 144 mmol/L Potassium 3.7 3.5 - 5.2 mmol/L Chloride 98 96 - 106 mmol/L  
 CO2 27 20 - 29 mmol/L Calcium 9.8 8.7 - 10.2 mg/dL Protein, total 7.7 6.0 - 8.5 g/dL Albumin 4.7 3.5 - 5.5 g/dL GLOBULIN, TOTAL 3.0 1.5 - 4.5 g/dL A-G Ratio 1.6 1.2 - 2.2 Bilirubin, total 0.4 0.0 - 1.2 mg/dL Alk. phosphatase 80 39 - 117 IU/L  
 AST (SGOT) 18 0 - 40 IU/L  
 ALT (SGPT) 19 0 - 32 IU/L Narrative Performed at:  [address]  [id number] : Prabhu Montano MD, Phone:  [phone] HEMOGLOBIN A1C WITH EAG Result Value Ref Range Hemoglobin A1c 5.7 (H) 4.8 - 5.6 % Comment:  
            Prediabetes: 5.7 - 6.4 Diabetes: >6.4 Glycemic control for adults with diabetes: <7.0 Estimated average glucose 117 mg/dL Narrative Performed at:  [address]  [id number] : Prabhu Montano MD, Phone:  [phone] RECOMMENDATIONS: 
None. Keep up the good work! Please call me if you have any questions: [phone] Sincerely, 
 
 
Harmony Sanders MD

## 2018-04-16 NOTE — MR AVS SNAPSHOT
[facility] [address] [facility] [address] [facility] 
[phone] Patient: Jaden Tang MRN:  [id number] Visit Information Date & Time Provider Department Dept. Phone Encounter #  
 4/16/2018  3:00 PM Alec Castellon, [address] [place] [address],[address] [phone] [id number] Follow-up Instructions Return in about 6 months (around 10/16/2018). Upcoming Health Maintenance Date Due COLONOSCOPY 1/14/2018 PAP AKA CERVICAL CYTOLOGY 9/12/2019 BREAST CANCER SCRN MAMMOGRAM 11/10/2019 DTaP/Tdap/Td series (2 - Td) 1/1/2020 Allergies as of 4/16/2018  Review Complete On: 4/16/2018 By: Mirella Neely LPN Severity Noted Reaction Type Reactions Lisinopril Medium 04/01/2013   Intolerance Cough Shellfish Derived  02/04/2014    Hives Current Immunizations  Reviewed on 2/4/2014 Name Date Influenza Vaccine PF 11/11/2013 Influenza Vaccine Split 9/7/2012, 1/21/2011 Tdap 1/1/2010 Not reviewed this visit You Were Diagnosed With   
  
 Codes Comments Essential hypertension    -  Primary ICD-10-CM: I10 
ICD-9-CM: 401.9 Microcytosis     ICD-10-CM: R71.8 ICD-9-CM: 790.09   
 IFG (impaired fasting glucose)     ICD-10-CM: R73.01 
ICD-9-CM: 790.21 Dyslipidemia     ICD-10-CM: E78.5 ICD-9-CM: 272.4 Obesity (BMI 30.0-34.9)     ICD-10-CM: X26.4 ICD-9-CM: 278.00 Abnormality of left breast on screening mammogram     ICD-10-CM: R92.8 ICD-9-CM: 793.80 Colon cancer screening     ICD-10-CM: Z12.11 ICD-9-CM: V76.51 Chronic bilateral low back pain with bilateral sciatica     ICD-10-CM: M54.42, M54.41, G89.29 ICD-9-CM: 724.2, 724.3, 338.29 Vitals BP Pulse Temp Resp Height(growth percentile) Weight(growth percentile) 121/79 (BP 1 Location: Left arm, BP Patient Position: Sitting) 73 98.1 °F (36.7 °C) (Oral) 16 5' 1\" (1.549 m) 191 lb (86.6 kg) SpO2 BMI OB Status Smoking Status 96% 36.09 kg/m2 Menopause Never Smoker Vitals History BMI and BSA Data Body Mass Index Body Surface Area 36.09 kg/m 2 1.93 m 2 Preferred Pharmacy Pharmacy Name Phone [address] [facility] [phone] Your Updated Medication List  
  
   
This list is accurate as of 4/16/18  3:02 PM.  Always use your most recent med list. amLODIPine 10 mg tablet Commonly known as:  Joana Cordial TAKE ONE TABLET BY MOUTH ONCE DAILY  
  
 cetirizine 10 mg tablet Commonly known as:  ZYRTEC  
TAKE ONE TABLET BY MOUTH ONCE DAILY AT BEDTIME  
  
 gabapentin 100 mg capsule Commonly known as:  NEURONTIN Take 1 Cap by mouth three (3) times daily. losartan-hydroCHLOROthiazide 100-25 mg per tablet Commonly known as:  HYZAAR  
TAKE ONE TABLET BY MOUTH ONCE DAILY  
  
 melatonin 1 mg tablet Take 1 mg by mouth nightly. MULTIPLE VITAMIN PO Take  by mouth. One daily  
  
 triamcinolone acetonide 0.1 % topical cream  
Commonly known as:  KENALOG Apply  to affected area two (2) times a day. use thin layer mon to Friday only bid VIT E-VIT C-MAGNESIUM-ZINC PO Take  by mouth. VITAMIN D3 PO Take  by mouth. Prescriptions Sent to Pharmacy Refills  
 gabapentin (NEURONTIN) 100 mg capsule 1 Sig: Take 1 Cap by mouth three (3) times daily. Class: Normal  
 Pharmacy: [facility] DR NAILA BANKS [address] [facility] Ph #: [phone] Route: Oral  
  
We Performed the Following HEMOGLOBIN A1C WITH EAG [44887 CPT(R)] METABOLIC PANEL, COMPREHENSIVE [22439 CPT(R)] REFERRAL TO GASTROENTEROLOGY [DFI25 Custom] Follow-up Instructions Return in about 6 months (around 10/16/2018). To-Do List   
 04/16/2018 Imaging:  XR SPINE LUMB 2 OR 3 V   
  
 05/07/2018 Imaging:  [facility] LT DX INCL CAD Referral Information Referral ID Referred By Referred To  
  
 [id number] [facility] [address] [place] [address] [place], [address] [facility] Visits Status Start Date End Date 1 New Request 4/16/18 4/16/19 If your referral has a status of pending review or denied, additional information will be sent to support the outcome of this decision. Introducing [facility]! Hollie Turner introduces [employer] patient portal. Now you can access parts of your medical record, email your doctor's office, and request medication refills online. 1. In your internet browser, go to [email or web address]. [email or web address] 2. Click on the First Time User? Click Here link in the Sign In box. You will see the New Member Sign Up page. 3. Enter your [employer]inch Access Code exactly as it appears below. You will not need to use this code after youve completed the sign-up process. If you do not sign up before the expiration date, you must request a new code. · [employer]inch Access Code: [email or web address] Expires: 7/15/2018  3:02 PM 
 
4. Enter the last four digits of your Social Security Number (xxxx) and Date of Birth (mm/dd/yyyy) as indicated and click Submit. You will be taken to the next sign-up page. 5. Create a Adezet ID. This will be your Sinch login ID and cannot be changed, so think of one that is secure and easy to remember. 6. Create a Sinch password. You can change your password at any time. 7. Enter your Password Reset Question and Answer. This can be used at a later time if you forget your password. 8. Enter your e-mail address. You will receive e-mail notification when new information is available in [address] 9. Click Sign Up. You can now view and download portions of your medical record. 10. Click the Download Summary menu link to download a portable copy of your medical information.  
 
If you have questions, please visit the Frequently Asked Questions section of the [employer]. Remember, [employer] labshart is NOT to be used for urgent needs. For medical emergencies, dial 911. Now available from your iPhone and Android! Please provide this summary of care documentation to your next provider. Your primary care clinician is listed as Sheri Hillman. If you have any questions after today's visit, please call [phone].

## 2018-04-17 LAB
ALBUMIN SERPL-MCNC: 4.2 G/DL (ref 3.5–5.5)
ALBUMIN/GLOB SERPL: 1.5 {RATIO} (ref 1.2–2.2)
ALP SERPL-CCNC: 83 IU/L (ref 39–117)
ALT SERPL-CCNC: 16 IU/L (ref 0–32)
AST SERPL-CCNC: 17 IU/L (ref 0–40)
BILIRUB SERPL-MCNC: <0.2 MG/DL (ref 0–1.2)
BUN SERPL-MCNC: 12 MG/DL (ref 6–24)
BUN/CREAT SERPL: 17 (ref 9–23)
CALCIUM SERPL-MCNC: 9.1 MG/DL (ref 8.7–10.2)
CHLORIDE SERPL-SCNC: 103 MMOL/L (ref 96–106)
CO2 SERPL-SCNC: 28 MMOL/L (ref 18–29)
CREAT SERPL-MCNC: 0.71 MG/DL (ref 0.57–1)
EST. AVERAGE GLUCOSE BLD GHB EST-MCNC: 114 MG/DL
GFR SERPLBLD CREATININE-BSD FMLA CKD-EPI: 112 ML/MIN/1.73
GFR SERPLBLD CREATININE-BSD FMLA CKD-EPI: 98 ML/MIN/1.73
GLOBULIN SER CALC-MCNC: 2.8 G/DL (ref 1.5–4.5)
GLUCOSE SERPL-MCNC: 102 MG/DL (ref 65–99)
HBA1C MFR BLD: 5.6 % (ref 4.8–5.6)
POTASSIUM SERPL-SCNC: 3.7 MMOL/L (ref 3.5–5.2)
PROT SERPL-MCNC: 7 G/DL (ref 6–8.5)
SODIUM SERPL-SCNC: 145 MMOL/L (ref 134–144)

## 2018-06-22 RX ORDER — CETIRIZINE HCL 10 MG
TABLET ORAL
Qty: 30 TAB | Refills: 5 | Status: SHIPPED | OUTPATIENT
Start: 2018-06-22 | End: 2020-02-09

## 2018-07-03 ENCOUNTER — HOSPITAL ENCOUNTER (OUTPATIENT)
Dept: MAMMOGRAPHY | Age: 53
Discharge: HOME | End: 2018-07-03
Attending: INTERNAL MEDICINE
Payer: COMMERCIAL

## 2018-07-03 DIAGNOSIS — R92.8 ABNORMALITY OF LEFT BREAST ON SCREENING MAMMOGRAM: ICD-10-CM

## 2018-07-03 DIAGNOSIS — Z09 FOLLOW-UP EXAM, 3-6 MONTHS SINCE PREVIOUS EXAM: ICD-10-CM

## 2018-07-03 PROCEDURE — 77065 DX MAMMO INCL CAD UNI: CPT

## 2018-10-29 ENCOUNTER — TELEPHONE (OUTPATIENT)
Dept: INTERNAL MEDICINE CLINIC | Age: 53
End: 2018-10-29

## 2018-10-29 DIAGNOSIS — E78.5 DYSLIPIDEMIA: ICD-10-CM

## 2018-10-29 DIAGNOSIS — Z13.29 SCREENING FOR THYROID DISORDER: ICD-10-CM

## 2018-10-29 DIAGNOSIS — R73.01 IFG (IMPAIRED FASTING GLUCOSE): ICD-10-CM

## 2018-10-29 DIAGNOSIS — I10 ESSENTIAL HYPERTENSION: Primary | ICD-10-CM

## 2018-10-29 NOTE — TELEPHONE ENCOUNTER
MD Navya Nevarez LPN   Caller: Unspecified (Today,  2:57 PM)             Lipids, cmp, a1c, cbc, tsh       [facility], labs ordered and mailed to [facility].

## 2018-10-29 NOTE — TELEPHONE ENCOUNTER
Called patient to remind her of her appointment and she is requesting that we send over her lab orders to [facility]. Patient requesting to get them done tomorrow am at 8.

## 2018-10-30 ENCOUNTER — OFFICE VISIT (OUTPATIENT)
Dept: INTERNAL MEDICINE CLINIC | Age: 53
End: 2018-10-30

## 2018-10-30 VITALS
OXYGEN SATURATION: 97 % | SYSTOLIC BLOOD PRESSURE: 111 MMHG | TEMPERATURE: 98 F | BODY MASS INDEX: 36.09 KG/M2 | RESPIRATION RATE: 16 BRPM | HEART RATE: 72 BPM | HEIGHT: 61 IN | DIASTOLIC BLOOD PRESSURE: 70 MMHG

## 2018-10-30 DIAGNOSIS — Z23 ENCOUNTER FOR IMMUNIZATION: ICD-10-CM

## 2018-10-30 DIAGNOSIS — Z00.00 PHYSICAL EXAM, ANNUAL: ICD-10-CM

## 2018-10-30 DIAGNOSIS — I10 ESSENTIAL HYPERTENSION: Primary | ICD-10-CM

## 2018-10-30 PROBLEM — E66.01 SEVERE OBESITY (HCC): Status: ACTIVE | Noted: 2018-10-30

## 2018-10-30 RX ORDER — GABAPENTIN 100 MG/1
100 CAPSULE ORAL 3 TIMES DAILY
Qty: 30 CAP | Refills: 1 | Status: SHIPPED | OUTPATIENT
Start: 2018-10-30 | End: 2019-04-12 | Stop reason: SDUPTHER

## 2018-10-30 NOTE — PROGRESS NOTES
HISTORY OF PRESENT ILLNESS Daphnie Chicas is a 46 y.o. female. HPI Last here 4/16/18. Pt is here to f/u on chronic conditions. 
   
BP today is 111/70 Continues on norvasc 10mg and losartan-HCTZ 100-25mg daily 
  
Wt today is 189 lbs --down 2 lbs x lov Pt is still drinking sweet tea regularly Discussed [employer] Discussed diet and w/l  
  
Pt completed labs today, results are pending 
  
Lov, pt c/o back pain radiating to her BLE Reviewed XR L spine 4/18: No acute fracture or subluxation. Lov, provided gabapentin - pt did not every get this med Pt is still having sx Will reorder gabapentin to use prn 1-3 times per day 
  
Pt follows with Dr. Aggie Batista (ENT) prn Last visit was 4/2017 Pt will only f/u with this physician prn  
  
Pt previously followed with Dr. 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  
  
Pt saw Dr. Veronica Yung (derm) Pt was provided with a cream, which improved her dry skin Pt has not seen this physician recently Advised her to f/u for skin check 
  
Continues on zyrtec daily for allergies 
   
Continues on vit D OTC 2000U daily  
  
Reviewed mammo R sided 7/18: benign, repeat BL in 6 months Pt c/o clogged up R ear Pt will work on unclogging this herself with peroxide and water 
   
Recall ECHO 4/18/17: heart squeeze nl, no MR 
  
PREVENTIVE:   
Colonoscopy: 7/01/13, Dr. Maia Hanson, repeat 5 years, due, referred, pt will schedule for 12/18 Pap: Dr. Courtney Dodd, 11/17, at least every other year, scheduled for 2/19 Mammogram: 11/17, mammo and US L breast, R sided done 7/18, BL due 11/18 DEXA: not yet needed Tdap: 2010, pt will return to get this updated Pneumovax: not yet needed [id number]: not yet needed Shingrix: not yet needed Flu shot: 10/30/18   
A1c: 6/14 5.8, 1/15 5.9, 2/16 5.7, 8/16 6.0, 10/17 5.5, 4/18 5.6 Eye exam: [facility], 3/15, due, advised to schedule, reminded Hep C screen: 1/15, negative EK16, nsr  
Lipids: 10/17  Patient Active Problem List  
 Diagnosis Date Noted  Murmur 2011  Allergic rhinitis 2010  Hypertension 10/02/2009  Microcytosis 10/02/2009 Current Outpatient Medications Medication Sig Dispense Refill  losartan-hydroCHLOROthiazide (HYZAAR) 100-25 mg per tablet TAKE 1 TABLET BY MOUTH ONCE DAILY 30 Tab 0  
 cetirizine (ZYRTEC) 10 mg tablet TAKE ONE TABLET BY MOUTH ONCE DAILY AT BEDTIME 30 Tab 5  
 CHOLECALCIFEROL, VITAMIN D3, (VITAMIN D3 PO) Take  by mouth.  VIT E/VIT C/MAGNESIUM/ZINC SUL (VIT E-VIT C-MAGNESIUM-ZINC PO) Take  by mouth.  gabapentin (NEURONTIN) 100 mg capsule Take 1 Cap by mouth three (3) times daily. 30 Cap 1  
 amLODIPine (NORVASC) 10 mg tablet TAKE ONE TABLET BY MOUTH ONCE DAILY 30 Tab 6  
 triamcinolone acetonide (KENALOG) 0.1 % topical cream Apply  to affected area two (2) times a day. use thin layer mon to Friday only bid 30 g 0  
 melatonin 1 mg tablet Take 1 mg by mouth nightly.  MULTIVITAMINS (MULTIPLE VITAMIN PO) Take  by mouth. One daily Past Surgical History:  
Procedure Laterality Date  ENDOSCOPY, COLON, DIAGNOSTIC    
 HX GYN    
 tubal ligation Lab Results Component Value Date/Time WBC 8.4 10/16/2017 09:30 AM  
 HGB 11.4 10/16/2017 09:30 AM  
 HCT 35.6 10/16/2017 09:30 AM  
 PLATELET 770 (H)  09:30 AM  
 MCV 71 (L) 10/16/2017 09:30 AM  
 
Lab Results Component Value Date/Time Cholesterol, total 190 10/16/2017 09:30 AM  
 HDL Cholesterol 60 10/16/2017 09:30 AM  
 LDL, calculated 111 (H) 10/16/2017 09:30 AM  
 Triglyceride 93 10/16/2017 09:30 AM  
 
Lab Results Component Value Date/Time  GFR est non-AA 98 2018 03:12 PM  
 GFR est  2018 03:12 PM  
 Creatinine 0.71 2018 03:12 PM  
 BUN 12 2018 03:12 PM  
 Sodium 145 (H) 2018 03:12 PM  
 Potassium 3.7 2018 03:12 PM  
 Chloride 103 04/16/2018 03:12 PM  
 CO2 28 04/16/2018 03:12 PM  
  
Review of Systems Respiratory: Negative for shortness of breath. Cardiovascular: Negative for chest pain. Physical Exam  
Constitutional: She is oriented to person, place, and time. She appears well-developed and well-nourished. No distress. HENT:  
Head: Normocephalic and atraumatic. Right Ear: External ear normal.  
Left Ear: External ear normal.  
Mouth/Throat: Oropharynx is clear and moist. No oropharyngeal exudate. Cerumen to L ear Eyes: Conjunctivae and EOM are normal. Right eye exhibits no discharge. Left eye exhibits no discharge. Neck: Normal range of motion. Neck supple. No carotid bruits Cardiovascular: Normal rate, regular rhythm and intact distal pulses. Exam reveals no gallop and no friction rub. Murmur (1/6) heard. Pulmonary/Chest: Effort normal and breath sounds normal. No respiratory distress. She has no wheezes. She has no rales. She exhibits no tenderness. Abdominal: Soft. She exhibits no distension and no mass. There is no tenderness. There is no rebound and no guarding. Musculoskeletal: Normal range of motion. She exhibits no edema, tenderness or deformity. Lymphadenopathy:  
  She has no cervical adenopathy. Neurological: She is alert and oriented to person, place, and time. Coordination normal.  
Skin: Skin is warm and dry. No rash noted. She is not diaphoretic. No erythema. No pallor. Psychiatric: She has a normal mood and affect. Her behavior is normal.  
 
 
ASSESSMENT and PLAN 
  ICD-10-CM ICD-9-CM 1. Essential hypertension Controlled on norvasc 10mg and losartan-HCTZ 100-25, continue I10 401.9 2.  Physical exam, annual 
 
Discussed diet and w/l, discussed cutting out caloric beverages and WW, flu shot today, due for tdap but we are out, she will come back to get this completed, mammo due next month, addressed this with her, pelvic exam is scheduled, COLO she states she will schedule for 12/18, eye exam is also due, labs were just completed, await results Z00.00 V70.0 [facility] BI SCREENING INCL CAD Depression screen reviewed and negative. Scribed by Juve Bautista of [address] [place] [address] [address], as dictated by Dr. Tonio Hillman. Current diagnosis and concerns discussed with pt at length. Pt understands risks and benefits or current treatment plan and medications, and accepts the treatment and medication with any possible risks. Pt asks appropriate questions, which were answered. Pt was instructed to call with any concerns or problems. I have reviewed the note documented by the scribe. The services provided are my own. The documentation is accurate This note will not be viewable in [employer].

## 2018-10-31 LAB
ALBUMIN SERPL-MCNC: 4.7 G/DL (ref 3.5–5.5)
ALBUMIN/GLOB SERPL: 1.6 {RATIO} (ref 1.2–2.2)
ALP SERPL-CCNC: 80 IU/L (ref 39–117)
ALT SERPL-CCNC: 19 IU/L (ref 0–32)
AST SERPL-CCNC: 18 IU/L (ref 0–40)
BILIRUB SERPL-MCNC: 0.4 MG/DL (ref 0–1.2)
BUN SERPL-MCNC: 13 MG/DL (ref 6–24)
BUN/CREAT SERPL: 16 (ref 9–23)
CALCIUM SERPL-MCNC: 9.8 MG/DL (ref 8.7–10.2)
CHLORIDE SERPL-SCNC: 98 MMOL/L (ref 96–106)
CO2 SERPL-SCNC: 27 MMOL/L (ref 20–29)
CREAT SERPL-MCNC: 0.8 MG/DL (ref 0.57–1)
EST. AVERAGE GLUCOSE BLD GHB EST-MCNC: 117 MG/DL
GLOBULIN SER CALC-MCNC: 3 G/DL (ref 1.5–4.5)
GLUCOSE SERPL-MCNC: 106 MG/DL (ref 65–99)
HBA1C MFR BLD: 5.7 % (ref 4.8–5.6)
POTASSIUM SERPL-SCNC: 3.7 MMOL/L (ref 3.5–5.2)
PROT SERPL-MCNC: 7.7 G/DL (ref 6–8.5)
SODIUM SERPL-SCNC: 142 MMOL/L (ref 134–144)

## 2018-11-05 RX ORDER — AMLODIPINE BESYLATE 10 MG/1
TABLET ORAL
Qty: 30 TAB | Refills: 6 | Status: SHIPPED | OUTPATIENT
Start: 2018-11-05 | End: 2019-07-03 | Stop reason: SDUPTHER

## 2019-01-24 RX ORDER — LOSARTAN POTASSIUM 100 MG/1
100 TABLET ORAL DAILY
Qty: 30 TAB | Refills: 1 | Status: SHIPPED | OUTPATIENT
Start: 2019-01-24 | End: 2022-06-17

## 2019-01-24 RX ORDER — HYDROCHLOROTHIAZIDE 25 MG/1
25 TABLET ORAL DAILY
Qty: 30 TAB | Refills: 1 | Status: SHIPPED | OUTPATIENT
Start: 2019-01-24 | End: 2019-03-25 | Stop reason: SDUPTHER

## 2019-01-24 NOTE — TELEPHONE ENCOUNTER
PCP: Tegan Pond MD    Last appt: 10/30/2018  No future appointments. Requested Prescriptions     Pending Prescriptions Disp Refills    losartan (COZAAR) 100 mg tablet 30 Tab 6     Sig: Take 1 Tab by mouth daily.  hydroCHLOROthiazide (HYDRODIURIL) 25 mg tablet 30 Tab 6     Sig: Take 1 Tab by mouth daily.

## 2019-02-22 ENCOUNTER — HOSPITAL ENCOUNTER (OUTPATIENT)
Dept: MAMMOGRAPHY | Age: 54
Discharge: HOME | End: 2019-02-22
Attending: INTERNAL MEDICINE
Payer: COMMERCIAL

## 2019-02-22 DIAGNOSIS — R92.8 ABNORMAL MAMMOGRAM: ICD-10-CM

## 2019-02-22 PROCEDURE — 77066 DX MAMMO INCL CAD BI: CPT

## 2019-02-23 RX ORDER — LOSARTAN POTASSIUM AND HYDROCHLOROTHIAZIDE 25; 100 MG/1; MG/1
TABLET ORAL
Qty: 30 TAB | Refills: 0 | Status: SHIPPED | OUTPATIENT
Start: 2019-02-23 | End: 2019-04-12

## 2019-04-12 ENCOUNTER — OFFICE VISIT (OUTPATIENT)
Dept: INTERNAL MEDICINE CLINIC | Age: 54
End: 2019-04-12

## 2019-04-12 VITALS
RESPIRATION RATE: 16 BRPM | OXYGEN SATURATION: 97 % | HEART RATE: 80 BPM | BODY MASS INDEX: 35.5 KG/M2 | SYSTOLIC BLOOD PRESSURE: 117 MMHG | WEIGHT: 188 LBS | TEMPERATURE: 98.1 F | HEIGHT: 61 IN | DIASTOLIC BLOOD PRESSURE: 70 MMHG

## 2019-04-12 DIAGNOSIS — M54.42 CHRONIC BILATERAL LOW BACK PAIN WITH BILATERAL SCIATICA: ICD-10-CM

## 2019-04-12 DIAGNOSIS — M25.551 RIGHT HIP PAIN: Primary | ICD-10-CM

## 2019-04-12 DIAGNOSIS — R23.2 HOT FLASHES: ICD-10-CM

## 2019-04-12 DIAGNOSIS — E66.01 SEVERE OBESITY (HCC): ICD-10-CM

## 2019-04-12 DIAGNOSIS — I10 ESSENTIAL HYPERTENSION: Primary | ICD-10-CM

## 2019-04-12 DIAGNOSIS — R73.01 IFG (IMPAIRED FASTING GLUCOSE): ICD-10-CM

## 2019-04-12 DIAGNOSIS — G89.29 CHRONIC BILATERAL LOW BACK PAIN WITH BILATERAL SCIATICA: ICD-10-CM

## 2019-04-12 DIAGNOSIS — M54.41 CHRONIC BILATERAL LOW BACK PAIN WITH BILATERAL SCIATICA: ICD-10-CM

## 2019-04-12 DIAGNOSIS — M25.551 HIP PAIN, RIGHT: ICD-10-CM

## 2019-04-12 DIAGNOSIS — E78.5 DYSLIPIDEMIA: ICD-10-CM

## 2019-04-12 RX ORDER — GABAPENTIN 100 MG/1
100 CAPSULE ORAL 3 TIMES DAILY
Qty: 30 CAP | Refills: 4 | Status: SHIPPED | OUTPATIENT
Start: 2019-04-12 | End: 2019-11-11 | Stop reason: SDUPTHER

## 2019-04-12 NOTE — PROGRESS NOTES
HISTORY OF PRESENT ILLNESS Hollie Hardin is a 46 y.o. female. HPI Last here 10/30/18. Pt is here to f/u on chronic conditions. 
   
BP today is 117/70 Pt does not monitor this at home Continues on norvasc 10mg and losartan-HCTZ 100-25mg At one point since lov the combo had been  into losartan and HCTZ, but she thinks she is back on the combo Discussed making sure she is taking both of these meds, either in combo or separate 
  
Wt today is 188 lbs --stable x lov Pt has cut back on sugars, salt, sodas, fried foods Discussed cutting back calories Discussed [employer] Discussed diet and w/l  
  
Reviewed labs 10/18 Will get labs today Of note, pt is not fasting - she ate a sandwich this morning 
  
Pt follows with Dr. Cameron Heaton (ENT) prn Last visit was 4/2017 Pt will only f/u with this physician prn  
No issues 
  
Pt previously followed with Dr. Kuldeep Stockton (hemo) to evaluate her anemia Pt states that this was ultimately a genetic variant Pt will only f/u with this physician prn  
  
Pt saw Dr. Kathy Chandler (derm) Pt was provided with a cream, which improved her dry skin Pt has not seen this physician recently Advised her to f/u for skin check Pt was started on effexor 37.5mg for hot flashes per her gyn This has been helping, she is happy with it 
  
Continues on zyrtec daily for allergies 
   
Continues on vit D OTC 2000U daily  
   
Pt c/o her hips aching Lov, pt c/o back pain radiating to her BLE Not taking gabapentin - she states she never got it She is still getting pain, in both lower back and hip She has been taking ibuprofen 600mg and naproxen 500 - she has not taken these together, just uses one prn Takes these about 3x per week Discussed not taking these medications too regularly longterm Pt wonders about trying tumeric, discussed this would be fine Reviewed XR spine 4/18: No acute fracture or subluxation. Will reorder gabapentin to use prn Ordered XR R hip Reviewed mammo : nl 
 
Recall ECHO 17: heart squeeze nl, no MR 
  
PREVENTIVE:   
Colonoscopy: 13, Dr. Briseida Corea, repeat 5 years, due, referred, pt will schedule, reminded, pt says she will get it in  Pap: Dr. Bebo Chang,  Mammogram: 19, negative  
DEXA: not yet needed Tdap: , pt will return to get this updated Pneumovax: not yet needed [id number]: not yet needed Shingrix: not yet needed Flu shot: 10/30/18   
A1c:  5.8, 1/15 5.9,  5.7,  6.0, 10/17 5.5,  5.6, 10/18 5.7 Eye exam: [facility],   
Hep C screen: 1/15, negative EK16, nsr  
Lipids: 10/17 LDL 111 
  
Patient Active Problem List  
 Diagnosis Date Noted  Severe obesity ([facility] [address] 10/30/2018  Murmur 2011  Allergic rhinitis 2010  Hypertension 10/02/2009  Microcytosis 10/02/2009 Current Outpatient Medications Medication Sig Dispense Refill  hydroCHLOROthiazide (HYDRODIURIL) 25 mg tablet TAKE 1 TABLET BY MOUTH ONCE DAILY 30 Tab 0  
 losartan-hydroCHLOROthiazide (HYZAAR) 100-25 mg per tablet TAKE 1 TABLET BY MOUTH ONCE DAILY 30 Tab 0  
 losartan (COZAAR) 100 mg tablet Take 1 Tab by mouth daily. 30 Tab 1  
 losartan-hydroCHLOROthiazide (HYZAAR) 100-25 mg per tablet TAKE 1 TABLET BY MOUTH ONCE DAILY 30 Tab 0  
 amLODIPine (NORVASC) 10 mg tablet TAKE 1 TABLET BY MOUTH ONCE DAILY 30 Tab 6  
 gabapentin (NEURONTIN) 100 mg capsule Take 1 Cap by mouth three (3) times daily. 30 Cap 1  cetirizine (ZYRTEC) 10 mg tablet TAKE ONE TABLET BY MOUTH ONCE DAILY AT BEDTIME 30 Tab 5  
 CHOLECALCIFEROL, VITAMIN D3, (VITAMIN D3 PO) Take  by mouth.  VIT E/VIT C/MAGNESIUM/ZINC SUL (VIT E-VIT C-MAGNESIUM-ZINC PO) Take  by mouth.  triamcinolone acetonide (KENALOG) 0.1 % topical cream Apply  to affected area two (2) times a day. use thin layer mon to Friday only bid 30 g 0  
 melatonin 1 mg tablet Take 1 mg by mouth nightly.  MULTIVITAMINS (MULTIPLE VITAMIN PO) Take  by mouth. One daily Past Surgical History:  
Procedure Laterality Date  ENDOSCOPY, COLON, DIAGNOSTIC    
 HX GYN    
 tubal ligation Lab Results Component Value Date/Time WBC 8.4 10/16/2017 09:30 AM  
 HGB 11.4 10/16/2017 09:30 AM  
 HCT 35.6 10/16/2017 09:30 AM  
 PLATELET 759 (H) 87/61/4529 09:30 AM  
 MCV 71 (L) 10/16/2017 09:30 AM  
 
Lab Results Component Value Date/Time Cholesterol, total 190 10/16/2017 09:30 AM  
 HDL Cholesterol 60 10/16/2017 09:30 AM  
 LDL, calculated 111 (H) 10/16/2017 09:30 AM  
 Triglyceride 93 10/16/2017 09:30 AM  
 
Lab Results Component Value Date/Time GFR est non-AA 84 10/30/2018 08:09 AM  
 GFR est AA 97 10/30/2018 08:09 AM  
 Creatinine 0.80 10/30/2018 08:09 AM  
 BUN 13 10/30/2018 08:09 AM  
 Sodium 142 10/30/2018 08:09 AM  
 Potassium 3.7 10/30/2018 08:09 AM  
 Chloride 98 10/30/2018 08:09 AM  
 CO2 27 10/30/2018 08:09 AM  
  
Review of Systems Respiratory: Negative for shortness of breath. Cardiovascular: Negative for chest pain. Musculoskeletal: Positive for joint pain. Physical Exam  
Constitutional: She is oriented to person, place, and time. She appears well-developed and well-nourished. No distress. HENT:  
Head: Normocephalic and atraumatic. Mouth/Throat: Oropharynx is clear and moist. No oropharyngeal exudate. Eyes: Conjunctivae and EOM are normal. Right eye exhibits no discharge. Left eye exhibits no discharge. Neck: Normal range of motion. Neck supple. Cardiovascular: Normal rate, regular rhythm and normal heart sounds. Exam reveals no gallop and no friction rub. No murmur heard. Pulmonary/Chest: Effort normal and breath sounds normal. No respiratory distress. She has no wheezes. She has no rales. She exhibits no tenderness. Musculoskeletal: Normal range of motion. She exhibits no edema, tenderness or deformity. 5/5 strength RLE Negative SLR on the R 
 Some pain with rotation of R hip Lymphadenopathy:  
  She has no cervical adenopathy. Neurological: She is alert and oriented to person, place, and time. Coordination normal.  
Skin: Skin is warm and dry. No rash noted. She is not diaphoretic. No erythema. No pallor. Psychiatric: She has a normal mood and affect. Her behavior is normal.  
 
 
ASSESSMENT and PLAN 
  ICD-10-CM ICD-9-CM 1. Essential hypertension Controlled on norvasc 10mg and losartan-HCTZ 100-25, her pills were previously split in half, she needs to verify she is correctly taking her losartan and HCTZ, she will check her pill bottles I10 401.9 LIPID PANEL  
   METABOLIC PANEL, COMPREHENSIVE  
   CBC W/O DIFF  
   HEMOGLOBIN A1C WITH EAG  
   TSH 3RD GENERATION 2. Severe obesity ([address] Discussed diet and w/l, portion control E66.01 278.01 LIPID PANEL  
   METABOLIC PANEL, COMPREHENSIVE  
   CBC W/O DIFF  
   HEMOGLOBIN A1C WITH EAG  
   TSH 3RD GENERATION 3. Dyslipidemia Diet controlled E78.5 272.4 LIPID PANEL  
   METABOLIC PANEL, COMPREHENSIVE  
   CBC W/O DIFF  
   HEMOGLOBIN A1C WITH EAG  
   TSH 3RD GENERATION 4. IFG (impaired fasting glucose) Check a1c 
 R73.01 790.21 LIPID PANEL  
   METABOLIC PANEL, COMPREHENSIVE  
   CBC W/O DIFF  
   HEMOGLOBIN A1C WITH EAG  
   TSH 3RD GENERATION 5. Chronic bilateral low back pain with bilateral sciatica Pt with mild back and R hip pain, some sciatic component, also likely hip arthritis, will check hip plain film today, reordered gabapentin to use prn, discussed limiting NSAID use M54.42 724.2 M54.41 724.3 G89.29 338.29   
6. Hot flashes Now on effexor which is working well, continue no change to dose R23.2 782.62   
7. Hip pain, right Check plain film, see above M25.551 719.45 XR HIP RT W OR WO PELV 2-3 VWS Depression screen reviewed and negative. Scribed by Berna Escobedo of [facility], as dictated by Dr. Vijaya Lehman. Current diagnosis and concerns discussed with pt at length. Pt understands risks and benefits or current treatment plan and medications, and accepts the treatment and medication with any possible risks. Pt asks appropriate questions, which were answered. Pt was instructed to call with any concerns or problems. I have reviewed the note documented by the scribe. The services provided are my own. The documentation is accurate

## 2019-04-13 LAB
ALBUMIN SERPL-MCNC: 4.2 G/DL (ref 3.5–5.5)
ALBUMIN/GLOB SERPL: 1.6 {RATIO} (ref 1.2–2.2)
ALP SERPL-CCNC: 71 IU/L (ref 39–117)
ALT SERPL-CCNC: 19 IU/L (ref 0–32)
AST SERPL-CCNC: 20 IU/L (ref 0–40)
BILIRUB SERPL-MCNC: <0.2 MG/DL (ref 0–1.2)
BUN SERPL-MCNC: 11 MG/DL (ref 6–24)
BUN/CREAT SERPL: 15 (ref 9–23)
CALCIUM SERPL-MCNC: 9.1 MG/DL (ref 8.7–10.2)
CHLORIDE SERPL-SCNC: 105 MMOL/L (ref 96–106)
CHOLEST SERPL-MCNC: 175 MG/DL (ref 100–199)
CO2 SERPL-SCNC: 25 MMOL/L (ref 20–29)
CREAT SERPL-MCNC: 0.74 MG/DL (ref 0.57–1)
ERYTHROCYTE [DISTWIDTH] IN BLOOD BY AUTOMATED COUNT: 17.3 % (ref 12.3–15.4)
EST. AVERAGE GLUCOSE BLD GHB EST-MCNC: 120 MG/DL
GLOBULIN SER CALC-MCNC: 2.6 G/DL (ref 1.5–4.5)
GLUCOSE SERPL-MCNC: 81 MG/DL (ref 65–99)
HBA1C MFR BLD: 5.8 % (ref 4.8–5.6)
HCT VFR BLD AUTO: 35.6 % (ref 34–46.6)
HDLC SERPL-MCNC: 59 MG/DL
HGB BLD-MCNC: 10.7 G/DL (ref 11.1–15.9)
LDLC SERPL CALC-MCNC: 99 MG/DL (ref 0–99)
MCH RBC QN AUTO: 22.4 PG (ref 26.6–33)
MCHC RBC AUTO-ENTMCNC: 30.1 G/DL (ref 31.5–35.7)
MCV RBC AUTO: 75 FL (ref 79–97)
PLATELET # BLD AUTO: 354 X10E3/UL (ref 150–379)
POTASSIUM SERPL-SCNC: 3.8 MMOL/L (ref 3.5–5.2)
PROT SERPL-MCNC: 6.8 G/DL (ref 6–8.5)
RBC # BLD AUTO: 4.78 X10E6/UL (ref 3.77–5.28)
SODIUM SERPL-SCNC: 144 MMOL/L (ref 134–144)
TRIGL SERPL-MCNC: 83 MG/DL (ref 0–149)
TSH SERPL DL<=0.005 MIU/L-ACNC: 2.31 UIU/ML (ref 0.45–4.5)
VLDLC SERPL CALC-MCNC: 17 MG/DL (ref 5–40)
WBC # BLD AUTO: 8.1 X10E3/UL (ref 3.4–10.8)

## 2019-04-15 NOTE — PROGRESS NOTES
Let her know mild stable anemia on labs--previously followed dr Laurel Lucio hematology, will continue to monitor--will send back if progressive Rest fine/stable

## 2019-04-17 NOTE — PROGRESS NOTES
Called, spoke to pt. Two pt identifiers confirmed. Pt informed per Dr. Stacie Navarro mild stable anemia on labs--previously followed dr Jean Claude Mcknight hematology, will continue to monitor--will send back if progressive. Pt informed per Dr. Stacie Navarro rest fine/stable. Pt given xr results as well. Pt verbalized understanding of information discussed w/ no further questions at this time.

## 2019-06-10 RX ORDER — HYDROCHLOROTHIAZIDE 25 MG/1
TABLET ORAL
Qty: 30 TAB | Refills: 0 | Status: SHIPPED | OUTPATIENT
Start: 2019-06-10 | End: 2019-07-10 | Stop reason: SDUPTHER

## 2019-07-03 RX ORDER — AMLODIPINE BESYLATE 10 MG/1
TABLET ORAL
Qty: 30 TAB | Refills: 6 | Status: SHIPPED | OUTPATIENT
Start: 2019-07-03 | End: 2020-02-05

## 2019-10-11 ENCOUNTER — OFFICE VISIT (OUTPATIENT)
Dept: INTERNAL MEDICINE CLINIC | Age: 54
End: 2019-10-11

## 2019-10-11 VITALS
HEIGHT: 61 IN | OXYGEN SATURATION: 98 % | TEMPERATURE: 97.5 F | WEIGHT: 185 LBS | BODY MASS INDEX: 34.93 KG/M2 | SYSTOLIC BLOOD PRESSURE: 135 MMHG | HEART RATE: 71 BPM | RESPIRATION RATE: 16 BRPM | DIASTOLIC BLOOD PRESSURE: 68 MMHG

## 2019-10-11 DIAGNOSIS — E66.01 SEVERE OBESITY (HCC): ICD-10-CM

## 2019-10-11 DIAGNOSIS — Z00.00 PHYSICAL EXAM: ICD-10-CM

## 2019-10-11 DIAGNOSIS — R23.2 HOT FLASHES: ICD-10-CM

## 2019-10-11 DIAGNOSIS — E78.5 DYSLIPIDEMIA: ICD-10-CM

## 2019-10-11 DIAGNOSIS — Z23 ENCOUNTER FOR IMMUNIZATION: Primary | ICD-10-CM

## 2019-10-11 DIAGNOSIS — R71.8 MICROCYTOSIS: ICD-10-CM

## 2019-10-11 DIAGNOSIS — R73.01 IFG (IMPAIRED FASTING GLUCOSE): ICD-10-CM

## 2019-10-11 DIAGNOSIS — I10 ESSENTIAL HYPERTENSION: ICD-10-CM

## 2019-10-11 DIAGNOSIS — M25.551 PAIN OF RIGHT HIP JOINT: ICD-10-CM

## 2019-10-11 DIAGNOSIS — H66.92 OTITIS OF LEFT EAR: ICD-10-CM

## 2019-10-11 RX ORDER — VENLAFAXINE HYDROCHLORIDE 37.5 MG/1
CAPSULE, EXTENDED RELEASE ORAL
Refills: 6 | COMMUNITY
Start: 2019-09-19 | End: 2021-11-03 | Stop reason: SDUPTHER

## 2019-10-11 RX ORDER — NEOMYCIN SULFATE, POLYMYXIN B SULFATE AND HYDROCORTISONE 10; 3.5; 1 MG/ML; MG/ML; [USP'U]/ML
3 SUSPENSION/ DROPS AURICULAR (OTIC) 3 TIMES DAILY
Qty: 10 ML | Refills: 0 | Status: SHIPPED | OUTPATIENT
Start: 2019-10-11 | End: 2019-10-18

## 2019-10-11 NOTE — PROGRESS NOTES
HISTORY OF PRESENT ILLNESS  Eppie Bloch is a 53 y.o. female. HPI   Last here 4/12/19.  Pt is here to f/u on chronic conditions.      BP today is 135/68   Bp at home 117/82   Continues on norvasc 10mg and losartan-HCTZ 100-25mg     Wt today is 185 lbs --down 3 lbs x lov   Pt has tried cutting back on sugar   Discussed diet and wt loss      Reviewed labs 4/19   Will get labs today       Pt follows with Dr. Muna Mtz (ENT) prn  Last visit was 4/2017  Pt will only f/u with this physician prn   No issues     Pt previously followed with Dr. Dillon Centeno (hemo) to evaluate her anemia  Pt states that this was ultimately a genetic variant  Pt will only f/u with this physician prn      Pt saw Dr. Vilma Smith (derm)  Pt was provided with a cream, which improved her dry skin  Pt has not seen this physician recently  Advised her to f/u for skin check     Pt was started on effexor 37.5mg for hot flashes per her gyn  This has been helping, she is happy with it     Continues on zyrtec daily for allergies      Continues on vit D OTC 2000U daily       Lov, Pt c/o her hips aching  Was given  gabapentin to use prn   Pt has not been taking this but states she will try this   Reviewed XR R hip 4/19: mild OA     Pt c/o ankle pain   States this is mild wear and tear     L Ears looked red on exam   Pt states it is not painful but itchy   Will give polymyxin ear drops         Recall ECHO 4/18/17: heart squeeze nl, no MR     PREVENTIVE:    Colonoscopy: 7/01/13, Dr. Santosh Montano, repeat 5 years, due, referred, pt will schedule, reminded, pt says she will get it in 6/19, reminded again today   Pap: Dr. Imelda Reeves, 2/19  Mammogram: 2/22/19, negative   DEXA: not yet needed  Tdap: 10/11/19   Pneumovax: not yet needed  [id number]: not yet needed  Shingrix: not yet needed  Flu shot 10/11/19   A1c: 6/14 5.8, 1/15 5.9, 2/16 5.7, 8/16 6.0, 10/17 5.5, 4/18 5.6, 10/18 5.7, 4/19 5.8   Eye exam: [facility], 11/18   Hep C screen: 1/15, negative  EKG: 8/19/16, nsr   Lipids: 4/19 LDL 99     Patient Active Problem List    Diagnosis Date Noted    IFG (impaired fasting glucose) 04/12/2019    Dyslipidemia 04/12/2019    Severe obesity ([address] 10/30/2018    Murmur 05/27/2011    Allergic rhinitis 02/05/2010    Hypertension 10/02/2009    Microcytosis 10/02/2009     Current Outpatient Medications   Medication Sig Dispense Refill    hydroCHLOROthiazide (HYDRODIURIL) 25 mg tablet TAKE 1 TABLET BY MOUTH ONCE DAILY 90 Tab 0    amLODIPine (NORVASC) 10 mg tablet TAKE 1 TABLET BY MOUTH ONCE DAILY 30 Tab 6    gabapentin (NEURONTIN) 100 mg capsule Take 1 Cap by mouth three (3) times daily. 30 Cap 4    losartan (COZAAR) 100 mg tablet Take 1 Tab by mouth daily. 30 Tab 1    cetirizine (ZYRTEC) 10 mg tablet TAKE ONE TABLET BY MOUTH ONCE DAILY AT BEDTIME 30 Tab 5    CHOLECALCIFEROL, VITAMIN D3, (VITAMIN D3 PO) Take  by mouth.  VIT E/VIT C/MAGNESIUM/ZINC SUL (VIT E-VIT C-MAGNESIUM-ZINC PO) Take  by mouth.  triamcinolone acetonide (KENALOG) 0.1 % topical cream Apply  to affected area two (2) times a day. use thin layer mon to Friday only bid 30 g 0    melatonin 1 mg tablet Take 1 mg by mouth nightly.  MULTIVITAMINS (MULTIPLE VITAMIN PO) Take  by mouth.  One daily        Past Surgical History:   Procedure Laterality Date    ENDOSCOPY, COLON, DIAGNOSTIC      HX GYN      tubal ligation      Lab Results   Component Value Date/Time    WBC 8.1 04/12/2019 10:48 AM    HGB 10.7 (L) 04/12/2019 10:48 AM    HCT 35.6 04/12/2019 10:48 AM    PLATELET 955 13/[birth year]1944 10:48 AM    MCV 75 (L) 04/12/2019 10:48 AM     Lab Results   Component Value Date/Time    Cholesterol, total 175 04/12/2019 10:48 AM    HDL Cholesterol 59 04/12/2019 10:48 AM    LDL, calculated 99 04/12/2019 10:48 AM    Triglyceride 83 04/12/2019 10:48 AM     Lab Results   Component Value Date/Time    GFR est non-AA 92 04/12/2019 10:48 AM    GFR est  04/12/2019 10:48 AM    Creatinine 0.74 04/12/2019 10:48 AM BUN 11 04/12/2019 10:48 AM    Sodium 144 04/12/2019 10:48 AM    Potassium 3.8 04/12/2019 10:48 AM    Chloride 105 04/12/2019 10:48 AM    CO2 25 04/12/2019 10:48 AM        Review of Systems   Respiratory: Negative for shortness of breath. Cardiovascular: Negative for chest pain. Physical Exam   Constitutional: She is oriented to person, place, and time. She appears well-developed and well-nourished. No distress. HENT:   Head: Normocephalic and atraumatic. Right Ear: External ear normal.   Left Ear: External ear normal.   Mouth/Throat: Oropharynx is clear and moist. No oropharyngeal exudate. L ear erythema    Eyes: Conjunctivae and EOM are normal. Right eye exhibits no discharge. Left eye exhibits no discharge. Neck: Normal range of motion. Neck supple. No carotid bruits    Cardiovascular: Normal rate and regular rhythm. Exam reveals no gallop and no friction rub. Murmur (stable soft ) heard. Pulmonary/Chest: Effort normal and breath sounds normal. No respiratory distress. She has no wheezes. She has no rales. She exhibits no tenderness. Abdominal: Soft. She exhibits no distension and no mass. There is no tenderness. There is no rebound and no guarding. Musculoskeletal: Normal range of motion. She exhibits no edema, tenderness or deformity. Lymphadenopathy:     She has no cervical adenopathy. Neurological: She is alert and oriented to person, place, and time. Coordination normal.   Skin: Skin is warm and dry. No rash noted. She is not diaphoretic. No erythema. No pallor. Psychiatric: She has a normal mood and affect. Her behavior is normal.       ASSESSMENT and PLAN    ICD-10-CM ICD-9-CM    1. Physical exam              Discussed diet wt loss tdap flu shot today colo overdue she is aware and will schedule eye exam due next month pelvic and mammo utd labs ordered  Z00.00 V70.9    2.  Severe obesity (HCC)            Discussed diet wt loss and portion control  L29.40 992.57 METABOLIC PANEL, BASIC      HEMOGLOBIN A1C WITH EAG   3. Essential hypertension          Controlled on norvasc and losartan hctz  G74 849.0 METABOLIC PANEL, BASIC        HEMOGLOBIN A1C WITH EAG   4. IFG (impaired fasting glucose)            Check a1c  S97.86 376.53 METABOLIC PANEL, BASIC      HEMOGLOBIN A1C WITH EAG   5. Dyslipidemia          Diet controlled  T48.5 521.5 METABOLIC PANEL, BASIC      HEMOGLOBIN A1C WITH EAG   6. Pain of right hip joint              Pt still with some pain in hip has gabapentin and will try this to see if it helps sxs    F04.053 286.72 METABOLIC PANEL, BASIC      HEMOGLOBIN A1C WITH EAG   7. Hot flashes                On effexor which helps  I60.4 224.78 METABOLIC PANEL, BASIC      HEMOGLOBIN A1C WITH EAG   8. Microcytosis                  Saw dr Bishnu Mars in the past stable  R61.4 848.39 METABOLIC PANEL, BASIC      HEMOGLOBIN A1C WITH EAG   9. Otitis of left ear                  Will give polymyxin drops  H66.92 382.9 neomycin-polymyxin-hydrocortisone, buffered, (PEDIOTIC) 3.5-10,000-1 mg/mL-unit/mL-% otic suspension                Depression screen reviewed and negative. Scribed by Adore Yun of [address] [place] [address] [address], as dictated by Dr. Corine Neville. Current diagnosis and concerns discussed with pt at length. Pt understands risks and benefits or current treatment plan and medications, and accepts the treatment and medication with any possible risks. Pt asks appropriate questions, which were answered. Pt was instructed to call with any concerns or problems. I have reviewed the note documented by the scribe. The services provided are my own.   The documentation is accurate

## 2019-10-12 LAB
BUN SERPL-MCNC: 13 MG/DL (ref 6–24)
BUN/CREAT SERPL: 15 (ref 9–23)
CALCIUM SERPL-MCNC: 10 MG/DL (ref 8.7–10.2)
CHLORIDE SERPL-SCNC: 100 MMOL/L (ref 96–106)
CO2 SERPL-SCNC: 27 MMOL/L (ref 20–29)
CREAT SERPL-MCNC: 0.86 MG/DL (ref 0.57–1)
EST. AVERAGE GLUCOSE BLD GHB EST-MCNC: 123 MG/DL
GLUCOSE SERPL-MCNC: 99 MG/DL (ref 65–99)
HBA1C MFR BLD: 5.9 % (ref 4.8–5.6)
POTASSIUM SERPL-SCNC: 3 MMOL/L (ref 3.5–5.2)
SODIUM SERPL-SCNC: 145 MMOL/L (ref 134–144)

## 2019-10-14 ENCOUNTER — TELEPHONE (OUTPATIENT)
Dept: INTERNAL MEDICINE CLINIC | Age: 54
End: 2019-10-14

## 2019-10-14 DIAGNOSIS — E87.6 HYPOKALEMIA: Primary | ICD-10-CM

## 2019-10-14 RX ORDER — CIPROFLOXACIN AND DEXAMETHASONE 3; 1 MG/ML; MG/ML
4 SUSPENSION/ DROPS AURICULAR (OTIC) 2 TIMES DAILY
Qty: 7.5 ML | Refills: 0 | Status: SHIPPED | OUTPATIENT
Start: 2019-10-14 | End: 2021-11-03

## 2019-10-14 RX ORDER — POTASSIUM CHLORIDE 20 MEQ/1
20 TABLET, EXTENDED RELEASE ORAL DAILY
Qty: 30 TAB | Refills: 3 | Status: SHIPPED | OUTPATIENT
Start: 2019-10-14 | End: 2019-12-05 | Stop reason: SDUPTHER

## 2019-10-14 NOTE — PROGRESS NOTES
After obtaining consent, and per verbal order from Dr. Lori Landa, patient received influenza vaccine given by Chantel Palacios in left Deltoid. Influenza Vaccine 0.5 mL IM now. Patient was observed for 10 minutes post injection. Patient tolerated injection well. VIS given. VORB per , tdap vaccine given after obtaining the consent form. 0.5 ml injected in the right deltoid muscle intramuscularly. Administered by Chantel Palacios. VIS given.

## 2019-10-14 NOTE — TELEPHONE ENCOUNTER
Called, spoke to pt. Two pt identifiers confirmed. Pt informed of lab results. See recent lab result encounter. Pt wants to report not picking up the ear drops for it was too expensive and asking if there is an alternative. Pt informed Dr. Asia Baum will be notified. Pt verbalized understanding of information discussed w/ no further questions at this time.

## 2019-10-14 NOTE — TELEPHONE ENCOUNTER
Pt returned a phone call from the practice.  Best contact number is [phone]     Message received & copied from [facility]

## 2019-10-15 NOTE — TELEPHONE ENCOUNTER
Called, spoke to pt. Two pt identifiers confirmed. Pt informed per Dr. Jenifer Castillo that another ear drop was sent in. Advised pt to contact pharmacy for cost.   Pt advised if unable to pay for prescription, monitor sx and callback if sx worsen. Pt verbalized understanding of information discussed w/ no further questions at this time.

## 2019-10-16 RX ORDER — HYDROCHLOROTHIAZIDE 25 MG/1
TABLET ORAL
Qty: 90 TAB | Refills: 0 | Status: SHIPPED | OUTPATIENT
Start: 2019-10-16 | End: 2020-01-16

## 2019-11-11 DIAGNOSIS — G62.9 NEUROPATHY: Primary | ICD-10-CM

## 2019-11-11 RX ORDER — GABAPENTIN 100 MG/1
100 CAPSULE ORAL 3 TIMES DAILY
Qty: 90 CAP | Refills: 0 | Status: SHIPPED | OUTPATIENT
Start: 2019-11-11 | End: 2020-04-02 | Stop reason: SDUPTHER

## 2019-11-11 NOTE — TELEPHONE ENCOUNTER
PCP: Rere Solo MD    Last appt: 10/11/2019  No future appointments. Requested Prescriptions     Pending Prescriptions Disp Refills    gabapentin (NEURONTIN) 100 mg capsule 30 Cap 4     Sig: Take 1 Cap by mouth three (3) times daily.

## 2019-11-13 ENCOUNTER — DOCUMENTATION ONLY (OUTPATIENT)
Dept: INTERNAL MEDICINE CLINIC | Age: 54
End: 2019-11-13

## 2019-12-02 ENCOUNTER — TELEPHONE (OUTPATIENT)
Dept: INTERNAL MEDICINE CLINIC | Age: 54
End: 2019-12-02

## 2019-12-02 NOTE — TELEPHONE ENCOUNTER
[facility]   Phone Number: [phone]             Caller's first and last name and relationship to patient (if not the patient): pt   Best contact number: [phone]   Preferred date and time: 12/3/19   Scheduled appointment date and time: n/a   Reason for appointment: Cold and congestion   Details to clarify the request:  Pt would like to be schedule to see her the doctor asap for cold and congestion.       Copy/Paste  ENVERA

## 2019-12-02 NOTE — TELEPHONE ENCOUNTER
Called, spoke to pt. Two pt identifiers confirmed. Pt offered and accepted appt for 12/3/19 [id number]. Pt verbalized understanding of information discussed w/ no further questions at this time.

## 2019-12-03 ENCOUNTER — OFFICE VISIT (OUTPATIENT)
Dept: INTERNAL MEDICINE CLINIC | Age: 54
End: 2019-12-03

## 2019-12-03 VITALS
HEART RATE: 69 BPM | DIASTOLIC BLOOD PRESSURE: 81 MMHG | RESPIRATION RATE: 16 BRPM | HEIGHT: 61 IN | BODY MASS INDEX: 35.3 KG/M2 | SYSTOLIC BLOOD PRESSURE: 132 MMHG | TEMPERATURE: 98 F | WEIGHT: 187 LBS | OXYGEN SATURATION: 100 %

## 2019-12-03 DIAGNOSIS — E87.6 HYPOKALEMIA: ICD-10-CM

## 2019-12-03 DIAGNOSIS — I10 ESSENTIAL HYPERTENSION: ICD-10-CM

## 2019-12-03 DIAGNOSIS — J01.00 ACUTE NON-RECURRENT MAXILLARY SINUSITIS: Primary | ICD-10-CM

## 2019-12-03 DIAGNOSIS — E78.5 DYSLIPIDEMIA: ICD-10-CM

## 2019-12-03 RX ORDER — AMOXICILLIN AND CLAVULANATE POTASSIUM 875; 125 MG/1; MG/1
1 TABLET, FILM COATED ORAL EVERY 12 HOURS
Qty: 14 TAB | Refills: 0 | Status: SHIPPED | OUTPATIENT
Start: 2019-12-03 | End: 2019-12-10

## 2019-12-03 NOTE — PROGRESS NOTES
HISTORY OF PRESENT ILLNESS  Navi Donald is a 53 y.o. female. HPI   Pt was last here 10/11/19. Pt is here for acute care. Pt c/o cough, congestion, sinus pressure  x this Thursday (6 days)   She states the sxs are getting worse   Both sides of sinuses hurt equally   Denies f/c   Has been taking otc cold medicine with sudafed   Was prev taking night time version of this but was making her too sleepy so now just taking daytime   Denies anyone being sick around her   Will give augmentin   Advised to use flonase and zyrtec/claritin   Will provide work note       Patient Active Problem List    Diagnosis Date Noted    IFG (impaired fasting glucose) 04/12/2019    Dyslipidemia 04/12/2019    Severe obesity ([address] 10/30/2018    Murmur 05/27/2011    Allergic rhinitis 02/05/2010    Hypertension 10/02/2009    Microcytosis 10/02/2009     Current Outpatient Medications   Medication Sig Dispense Refill    gabapentin (NEURONTIN) 100 mg capsule Take 1 Cap by mouth three (3) times daily. 90 Cap 0    hydroCHLOROthiazide (HYDRODIURIL) 25 mg tablet TAKE 1 TABLET BY MOUTH ONCE DAILY 90 Tab 0    potassium chloride (K-DUR, KLOR-CON) 20 mEq tablet Take 1 Tab by mouth daily. 30 Tab 3    ciprofloxacin-dexamethasone (CIPRODEX) 0.3-0.1 % otic suspension Administer 4 Drops in left ear two (2) times a day. 7.5 mL 0    venlafaxine-SR (EFFEXOR-XR) 37.5 mg capsule TAKE 1 CAPSULE BY MOUTH ONCE DAILY  6    amLODIPine (NORVASC) 10 mg tablet TAKE 1 TABLET BY MOUTH ONCE DAILY 30 Tab 6    losartan (COZAAR) 100 mg tablet Take 1 Tab by mouth daily. 30 Tab 1    cetirizine (ZYRTEC) 10 mg tablet TAKE ONE TABLET BY MOUTH ONCE DAILY AT BEDTIME 30 Tab 5    CHOLECALCIFEROL, VITAMIN D3, (VITAMIN D3 PO) Take  by mouth.  VIT E/VIT C/MAGNESIUM/ZINC SUL (VIT E-VIT C-MAGNESIUM-ZINC PO) Take  by mouth.  triamcinolone acetonide (KENALOG) 0.1 % topical cream Apply  to affected area two (2) times a day.  use thin layer mon to Friday only bid 30 g 0    melatonin 1 mg tablet Take 1 mg by mouth nightly.  MULTIVITAMINS (MULTIPLE VITAMIN PO) Take  by mouth. One daily        Past Surgical History:   Procedure Laterality Date    ENDOSCOPY, COLON, DIAGNOSTIC      HX GYN      tubal ligation      Lab Results   Component Value Date/Time    WBC 8.1 04/12/2019 10:48 AM    HGB 10.7 (L) 04/12/2019 10:48 AM    HCT 35.6 04/12/2019 10:48 AM    PLATELET 441 98/14/9084 10:48 AM    MCV 75 (L) 04/12/2019 10:48 AM     Lab Results   Component Value Date/Time    Cholesterol, total 175 04/12/2019 10:48 AM    HDL Cholesterol 59 04/12/2019 10:48 AM    LDL, calculated 99 04/12/2019 10:48 AM    Triglyceride 83 04/12/2019 10:48 AM     Lab Results   Component Value Date/Time    GFR est non-AA 76 10/11/2019 02:43 PM    GFR est AA 88 10/11/2019 02:43 PM    Creatinine 0.86 10/11/2019 02:43 PM    BUN 13 10/11/2019 02:43 PM    Sodium 145 (H) 10/11/2019 02:43 PM    Potassium 3.0 (L) 10/11/2019 02:43 PM    Chloride 100 10/11/2019 02:43 PM    CO2 27 10/11/2019 02:43 PM        Review of Systems   HENT: Positive for congestion and sinus pain. Respiratory: Positive for cough. Negative for shortness of breath. Cardiovascular: Negative for chest pain. Physical Exam  Constitutional:       General: She is not in acute distress. Appearance: She is well-developed. She is not diaphoretic. HENT:      Head: Normocephalic and atraumatic. Right Ear: Tympanic membrane, ear canal and external ear normal.      Left Ear: Tympanic membrane, ear canal and external ear normal.      Ears:      Comments: L TM hazy with scarring   R TM clear with more erythema, purulent fluid behind TM      Mouth/Throat:      Mouth: Mucous membranes are moist.      Pharynx: Oropharynx is clear. No oropharyngeal exudate or posterior oropharyngeal erythema. Eyes:      Conjunctiva/sclera: Conjunctivae normal.   Neck:      Musculoskeletal: Normal range of motion and neck supple.    Cardiovascular: Rate and Rhythm: Normal rate and regular rhythm. Heart sounds: Murmur (1/6) present. No friction rub. No gallop. Pulmonary:      Effort: Pulmonary effort is normal. No respiratory distress. Breath sounds: Normal breath sounds. No wheezing or rales. Chest:      Chest wall: No tenderness. Musculoskeletal: Normal range of motion. General: No tenderness or deformity. Lymphadenopathy:      Cervical: No cervical adenopathy. Skin:     General: Skin is warm and dry. Coloration: Skin is not pale. Findings: No erythema or rash. Neurological:      Mental Status: She is alert and oriented to person, place, and time. Coordination: Coordination normal.   Psychiatric:         Mood and Affect: Mood normal.         Behavior: Behavior normal.         ASSESSMENT and PLAN    ICD-10-CM ICD-9-CM    1. Acute non-recurrent maxillary sinusitis                augmentin BID flonase and claritin    J01.00 461.0    2. Essential hypertension            Well controlled  I10 401.9       3 hypokalemia- started on klor con daily check bmp today       Scribed by Qamar Truong of [facility], as dictated by Dr. Luisana Willis. Current diagnosis and concerns discussed with pt at length. Pt understands risks and benefits or current treatment plan and medications, and accepts the treatment and medication with any possible risks. Pt asks appropriate questions, which were answered. Pt was instructed to call with any concerns or problems. I have reviewed the note documented by the scribe. The services provided are my own.   The documentation is accurate

## 2019-12-03 NOTE — LETTER
NOTIFICATION RETURN TO WORK / SCHOOL 
 
12/3/2019 7:59 AM 
 
Ms. Georges Robles [address] [place] [id number] To Whom It May Concern: 
 
Georges Robles is currently under the care of [facility]. She will return to work/school on: Thursday, December 5th, 2019. If there are questions or concerns please have the patient contact our office.  
 
 
 
Sincerely, 
 
 
Vijaya Park MD

## 2019-12-04 LAB
BUN SERPL-MCNC: 8 MG/DL (ref 6–24)
BUN/CREAT SERPL: 9 (ref 9–23)
CALCIUM SERPL-MCNC: 9.7 MG/DL (ref 8.7–10.2)
CHLORIDE SERPL-SCNC: 97 MMOL/L (ref 96–106)
CHOLEST SERPL-MCNC: 189 MG/DL (ref 100–199)
CO2 SERPL-SCNC: 28 MMOL/L (ref 20–29)
CREAT SERPL-MCNC: 0.88 MG/DL (ref 0.57–1)
GLUCOSE SERPL-MCNC: 84 MG/DL (ref 65–99)
HDLC SERPL-MCNC: 60 MG/DL
LDLC SERPL CALC-MCNC: 100 MG/DL (ref 0–99)
POTASSIUM SERPL-SCNC: 3.3 MMOL/L (ref 3.5–5.2)
SODIUM SERPL-SCNC: 142 MMOL/L (ref 134–144)
TRIGL SERPL-MCNC: 145 MG/DL (ref 0–149)
TSH SERPL DL<=0.005 MIU/L-ACNC: 3.05 UIU/ML (ref 0.45–4.5)
VLDLC SERPL CALC-MCNC: 29 MG/DL (ref 5–40)

## 2019-12-05 DIAGNOSIS — E87.6 HYPOKALEMIA: Primary | ICD-10-CM

## 2019-12-05 RX ORDER — POTASSIUM CHLORIDE 20 MEQ/1
20 TABLET, EXTENDED RELEASE ORAL 2 TIMES DAILY
Qty: 60 TAB | Refills: 1 | Status: SHIPPED | OUTPATIENT
Start: 2019-12-05 | End: 2020-10-21 | Stop reason: SDUPTHER

## 2019-12-05 NOTE — PROGRESS NOTES
k still low should be on klor con 20meq daily --needs to take daily if she HAS bee compliant need to increase dose to 40meq daily    Repeat bmp in 2-3 week s

## 2019-12-05 NOTE — PROGRESS NOTES
Called, spoke to pt. Two pt identifiers confirmed. Pt informed per Dr. Isabella Smiley potassium is low--reports compliance on 20meq. Pt states given the size of the pill, she crushes it and mixes it with water and then she drinks it. Pt informed per Dr. Isabella Smiley to increase to 40meq--pended. Pt informed per Dr. Isabella Smiley repeat bmp in 2-3wks--Labs ordered and mailed to pt. Pt verbalized understanding of information discussed w/ no further questions at this time.

## 2020-02-05 RX ORDER — AMLODIPINE BESYLATE 10 MG/1
TABLET ORAL
Qty: 30 TAB | Refills: 0 | Status: SHIPPED | OUTPATIENT
Start: 2020-02-05 | End: 2020-03-10

## 2020-02-09 RX ORDER — CETIRIZINE HCL 10 MG
TABLET ORAL
Qty: 30 TAB | Refills: 0 | Status: SHIPPED | OUTPATIENT
Start: 2020-02-09 | End: 2020-04-02 | Stop reason: SDUPTHER

## 2020-03-10 RX ORDER — AMLODIPINE BESYLATE 10 MG/1
TABLET ORAL
Qty: 30 TAB | Refills: 0 | Status: SHIPPED | OUTPATIENT
Start: 2020-03-10 | End: 2020-04-02 | Stop reason: SDUPTHER

## 2020-03-10 NOTE — TELEPHONE ENCOUNTER
Called, spoke to pt  Received two pt identifiers  Pt offered and accepted appt for 04/02/2020 @ 1100  Pt verbalizes understanding of the instructions and has no further questions at this time.

## 2020-04-01 NOTE — PROGRESS NOTES
HISTORY OF PRESENT ILLNESS  Edilia Kwong is a 53 y.o. female. HPI   This is an established visit completed with telemedicine was completed with video assist  the patient acknowledges and agrees to this method of visitation  grisel      Last here 10/11/19. Pt is here to f/u on chronic conditions.      BP is generally well controlled  Bp at home 154/86 this am  She had coffee before taking it however  Mostly 134/80, 150/81, hr 77, 140/77  Before at lov was well controlled    Temp 97.2    Continues on [id number] and losartan-HCTZ 100-25mg     Wt was 185 lbs --  She is 196;b at home  gainged wt discussed portions  Eating too much b/c bored at home.      Discussed diet and wt loss      Reviewed labs   k was low  Was on klor con 20meq, increased it  Pt is on klor con 40meq per day         Pt follows with Dr. Bambi Ballesteros (ENT) prn  Last visit was 4/2017  Pt will only f/u with this physician prn   No issues     Pt previously followed with Dr. Luma Brizuela (hemo) to evaluate her anemia  Pt states that this was ultimately a genetic variant  Pt will only f/u with this physician prn      Pt saw Dr. Yue Jernigan (derm)  Pt was provided with a cream, which improved her dry skin  Pt has not seen this physician recently  Her scalp is getting dry and patchy  Advised her to f/u for skin check    Has eczema on wrist on exam  Has dry flakey skin      Pt was started on effexor 37.5mg for hot flashes per her gyn  This has been helping, she is happy with it  Happy with the dose 4/20     Continues on zyrtec daily for allergies  Needs rf, has had more allergies with pollens in the air recently       Continues on vit D OTC 2000U daily       Lov, Pt c/o her hips aching  Was given  gabapentin to use prn   Pt has not been taking this but states she will try this --using goodys powder as needed  She would like to reestart gabapenting  Reviewed XR R hip 4/19: mild OA                   Recall ECHO 4/18/17: heart squeeze nl, no MR     PREVENTIVE:    Colonoscopy: 13, Dr. Adrienne Kelly, repeat 5 years, due, referred, pt will schedule, reminded, pt says she will get it in , reminded again today   Pap: Dr. Severiano Byers, 3/20  Mammogram: 19, negative --due will schedule  DEXA: not yet needed  Tdap: 10/11/19   Pneumovax: not yet needed  [id number]: not yet needed  Shingrix: not yet needed  Flu shot 10/11/19   A1c:  5.8, 1/15 5.9,  5.7,  6.0, 10/17 5.5,  5.6, 10/18 5.7,  5.8   Eye exam: [facility],  per pt    Hep C screen: 1/15, negative  EK16, nsr   Lipids:     Patient Active Problem List    Diagnosis Date Noted    IFG (impaired fasting glucose) 2019    Dyslipidemia 2019    Severe obesity ([address] 10/30/2018    Murmur 2011    Allergic rhinitis 2010    Hypertension 10/02/2009    Microcytosis 10/02/2009     Current Outpatient Medications   Medication Sig Dispense Refill    amLODIPine (NORVASC) 10 mg tablet Take 1 tablet by mouth once daily 30 Tab 0    cetirizine (ZYRTEC) 10 mg tablet TAKE 1 TABLET BY MOUTH ONCE DAILY AT BEDTIME 30 Tab 0    hydroCHLOROthiazide (HYDRODIURIL) 25 mg tablet TAKE 1 TABLET BY MOUTH ONCE DAILY 90 Tab 0    potassium chloride (K-DUR, KLOR-CON) 20 mEq tablet Take 1 Tab by mouth two (2) times a day. 60 Tab 1    gabapentin (NEURONTIN) 100 mg capsule Take 1 Cap by mouth three (3) times daily. 90 Cap 0    ciprofloxacin-dexamethasone (CIPRODEX) 0.3-0.1 % otic suspension Administer 4 Drops in left ear two (2) times a day. 7.5 mL 0    venlafaxine-SR (EFFEXOR-XR) 37.5 mg capsule TAKE 1 CAPSULE BY MOUTH ONCE DAILY  6    losartan (COZAAR) 100 mg tablet Take 1 Tab by mouth daily. 30 Tab 1    CHOLECALCIFEROL, VITAMIN D3, (VITAMIN D3 PO) Take  by mouth.  VIT E/VIT C/MAGNESIUM/ZINC SUL (VIT E-VIT C-MAGNESIUM-ZINC PO) Take  by mouth.  triamcinolone acetonide (KENALOG) 0.1 % topical cream Apply  to affected area two (2) times a day.  use thin layer mon to Friday only bid 30 g 0    melatonin 1 mg tablet Take 1 mg by mouth nightly.  MULTIVITAMINS (MULTIPLE VITAMIN PO) Take  by mouth. One daily         Lab Results   Component Value Date/Time    Cholesterol, total 189 12/03/2019 08:12 AM    HDL Cholesterol 60 12/03/2019 08:12 AM    LDL, calculated 100 (H) 12/03/2019 08:12 AM    Triglyceride 145 12/03/2019 08:12 AM     Lab Results   Component Value Date/Time    GFR est non-AA 74 12/03/2019 08:12 AM    GFR est AA 86 12/03/2019 08:12 AM    Creatinine 0.88 12/03/2019 08:12 AM    BUN 8 12/03/2019 08:12 AM    Sodium 142 12/03/2019 08:12 AM    Potassium 3.3 (L) 12/03/2019 08:12 AM    Chloride 97 12/03/2019 08:12 AM    CO2 28 12/03/2019 08:12 AM        Review of Systems   Respiratory: Negative for shortness of breath. Cardiovascular: Negative for chest pain. Physical Exam  Constitutional:       General: She is not in acute distress. Appearance: Normal appearance. She is not ill-appearing, toxic-appearing or diaphoretic. HENT:      Head: Normocephalic and atraumatic. Eyes:      General:         Right eye: No discharge. Left eye: No discharge. Conjunctiva/sclera: Conjunctivae normal.   Neurological:      General: No focal deficit present. Mental Status: She is alert and oriented to person, place, and time. Psychiatric:         Mood and Affect: Mood normal.         Behavior: Behavior normal.         ASSESSMENT and PLAN    ICD-10-CM ICD-9-CM    1. Essential hypertension    [id number] and losartan-HCTZ 100-25mg    Discussed options    Will work on decreasing salt and caffiene and loosing 10lb gained    bp has been well controlled in inic    If home readings remain elevated in 6 weeks she will call back clinic and will add toprol xl daily      I10 401.9    2. IFG (impaired fasting glucose)--check a1c R73.01 790.21    3. Severe obesity (HCC)--wt up 10lb    Discussed diet and wt loss E66.01 278.01    4.  Dyslipidemia--diet controlled E78.5 272.4    5. Hypokalemia--now on klor con 40meq daily     Will repeat bmp to ensure at goal  E87.6 276.8    6. Microcytosis--stable saw emerald in the past R71.8 790.09    7. Hot flashes--controlled on effexor R23.2 782.62    8. Seborrheic dermatitis--ketocon foam ordered L21.9 690.10    9. Eczema, unspecified type--triamcinolone cream prn  L30.9 692.9    10. Pain of both hip joints--restart gabapenting which helped with sciatic sx  M25.551 719.45     M25.552     Current diagnosis and concerns discussed with pt at length.  Understands risks and benefits or current treatment plan and medications and accepts the treatment and medication with any possible risks.   Pt asks appropriate questions which were answered.   Pt instructed to call with any concerns or problems.

## 2020-04-02 ENCOUNTER — VIRTUAL VISIT (OUTPATIENT)
Dept: INTERNAL MEDICINE CLINIC | Age: 55
End: 2020-04-02

## 2020-04-02 DIAGNOSIS — R73.01 IFG (IMPAIRED FASTING GLUCOSE): ICD-10-CM

## 2020-04-02 DIAGNOSIS — M25.552 PAIN OF BOTH HIP JOINTS: ICD-10-CM

## 2020-04-02 DIAGNOSIS — L21.9 SEBORRHEIC DERMATITIS: ICD-10-CM

## 2020-04-02 DIAGNOSIS — E66.01 SEVERE OBESITY (HCC): ICD-10-CM

## 2020-04-02 DIAGNOSIS — E87.6 HYPOKALEMIA: ICD-10-CM

## 2020-04-02 DIAGNOSIS — R71.8 MICROCYTOSIS: ICD-10-CM

## 2020-04-02 DIAGNOSIS — L30.9 ECZEMA, UNSPECIFIED TYPE: ICD-10-CM

## 2020-04-02 DIAGNOSIS — I10 ESSENTIAL HYPERTENSION: Primary | ICD-10-CM

## 2020-04-02 DIAGNOSIS — R23.2 HOT FLASHES: ICD-10-CM

## 2020-04-02 DIAGNOSIS — G62.9 NEUROPATHY: ICD-10-CM

## 2020-04-02 DIAGNOSIS — E78.5 DYSLIPIDEMIA: ICD-10-CM

## 2020-04-02 DIAGNOSIS — M25.551 PAIN OF BOTH HIP JOINTS: ICD-10-CM

## 2020-04-02 RX ORDER — AMLODIPINE BESYLATE 10 MG/1
TABLET ORAL
Qty: 90 TAB | Refills: 1 | Status: SHIPPED | OUTPATIENT
Start: 2020-04-02 | End: 2020-11-13

## 2020-04-02 RX ORDER — GABAPENTIN 100 MG/1
100 CAPSULE ORAL 3 TIMES DAILY
Qty: 90 CAP | Refills: 0 | Status: SHIPPED | OUTPATIENT
Start: 2020-04-02 | End: 2020-06-22

## 2020-04-02 RX ORDER — CETIRIZINE HCL 10 MG
TABLET ORAL
Qty: 30 TAB | Refills: 2 | Status: SHIPPED | OUTPATIENT
Start: 2020-04-02 | End: 2020-10-21 | Stop reason: SDUPTHER

## 2020-04-02 RX ORDER — KETOCONAZOLE 2 G/100G
AEROSOL, FOAM TOPICAL 2 TIMES DAILY
Qty: 100 G | Refills: 0 | Status: SHIPPED | OUTPATIENT
Start: 2020-04-02 | End: 2021-11-03

## 2020-04-02 RX ORDER — TRIAMCINOLONE ACETONIDE 1 MG/G
CREAM TOPICAL 2 TIMES DAILY
Qty: 30 G | Refills: 0 | Status: SHIPPED | OUTPATIENT
Start: 2020-04-02 | End: 2020-10-21 | Stop reason: SDUPTHER

## 2020-04-02 RX ORDER — CETIRIZINE HCL 10 MG
TABLET ORAL
Qty: 30 TAB | Refills: 0 | Status: SHIPPED | OUTPATIENT
Start: 2020-04-02 | End: 2020-09-13

## 2020-04-29 DIAGNOSIS — I10 ESSENTIAL HYPERTENSION: Primary | ICD-10-CM

## 2020-04-30 RX ORDER — HYDROCHLOROTHIAZIDE 25 MG/1
TABLET ORAL
Qty: 90 TAB | Refills: 0 | Status: SHIPPED | OUTPATIENT
Start: 2020-04-30 | End: 2020-08-12

## 2020-04-30 NOTE — TELEPHONE ENCOUNTER
CP: Jovany Summers MD    Last appt: 4/2/2020  No future appointments.     Requested Prescriptions     Pending Prescriptions Disp Refills    hydroCHLOROthiazide (HYDRODIURIL) 25 mg tablet [Pharmacy Med Name: hydroCHLOROthiazide 25 MG Oral Tablet] 90 Tab 0     Sig: Take 1 tablet by mouth once daily

## 2020-06-22 DIAGNOSIS — G62.9 NEUROPATHY: ICD-10-CM

## 2020-06-22 RX ORDER — GABAPENTIN 100 MG/1
CAPSULE ORAL
Qty: 90 CAP | Refills: 0 | Status: SHIPPED | OUTPATIENT
Start: 2020-06-22 | End: 2020-10-07

## 2020-09-13 RX ORDER — CETIRIZINE HCL 10 MG
TABLET ORAL
Qty: 30 TAB | Refills: 0 | Status: SHIPPED | OUTPATIENT
Start: 2020-09-13 | End: 2020-10-21

## 2020-10-07 DIAGNOSIS — G62.9 NEUROPATHY: ICD-10-CM

## 2020-10-07 RX ORDER — GABAPENTIN 100 MG/1
CAPSULE ORAL
Qty: 90 CAP | Refills: 0 | Status: SHIPPED | OUTPATIENT
Start: 2020-10-07 | End: 2021-01-15

## 2020-10-19 NOTE — PROGRESS NOTES
HISTORY OF PRESENT ILLNESS  Meaghan Gresham is a 63 y.o. female.   HPI     Last here 4/2/20. Pt is here to f/u on chronic conditions.      She c/o knot on L hand   Discussed this is due to more arthritis that causes more calcium deposits   Endorses pain on palpation   Will get xray   Discussed next step is seeing hand surgeon - will give referral     BP today 128/85  BP at home 130/80  Continues on [id number] and losartan-HCTZ 100-25mg     Wt is 190 lbs - up 5 lbs x lov    She walks twice a day with her dog, she moves a lot with her job   Discussed diet and wt loss      Reviewed labs   Ordered labs    Pt is on klor con 40meq per day -- she ran out of this, will restart     Pt follows with Dr. Erick Alfaro (ENT) prn  Last visit was 4/2017  Pt will only f/u with this physician prn   No issues     Pt previously followed with Dr. Kelvin Mueller (hemo) to evaluate her anemia  Pt states that this was ultimately a genetic variant  Pt will only f/u with this physician prn      Pt saw Dr. Radha Florez (derm)  Pt was provided with a cream, which improved her dry skin  Pt has not seen this physician recently  Her scalp is getting dry and patchy     Has eczema on wrist on exam  Has dry flakey skin      Pt was started on effexor 37.5mg for hot flashes per her gyn  This has been helping, she is happy with it  Happy with the dose 10/20     Continues on zyrtec daily for allergies      Continues on vit D OTC 25 mcg daily       Lov, Pt c/o her R hips aching  Lov, restarted gabapentin, this helps with pain   She c/o pain again today   She twisted her R ankle  She would like xray of R hip, ankle, and back - ordered   Discussed seeing ortho for hips     Recall ECHO 4/18/17: heart squeeze nl, no MR     PREVENTIVE:    Colonoscopy: 7/01/13, Dr. Milla Desouza, repeat 5 years, due, referred, pt will schedule, reminded, pt says she will get it in 6/19, reminded again today 10/20  Pap: Dr. Floyd Diaz, 3/20  Mammogram: 2/22/19, negative --due ordered   DEXA: not yet needed  Tdap: 10/11/19   Pneumovax: not yet needed  [id number]: not yet needed  Shingrix: not yet needed  Flu shot 10/11/19   A1c:  5.8, 1/15 5.9,  5.7,  6.0, 10/17 5.5,  5.6, 10/18 5.7,  5.8   Eye exam: [facility],  per pt, due reminded   Hep C screen: 1/15, negative  EK16, nsr   Lipids:     Patient Active Problem List    Diagnosis Date Noted    IFG (impaired fasting glucose) 2019    Dyslipidemia 2019    Severe obesity ([address] 10/30/2018    Murmur 2011    Allergic rhinitis 2010    Hypertension 10/02/2009    Microcytosis 10/02/2009     Current Outpatient Medications   Medication Sig Dispense Refill    potassium chloride (K-DUR, KLOR-CON) 20 mEq tablet Take 1 Tab by mouth two (2) times a day. 60 Tab 1    gabapentin (NEURONTIN) 100 mg capsule TAKE 1 CAPSULE BY MOUTH THREE TIMES DAILY 90 Cap 0    hydroCHLOROthiazide (HYDRODIURIL) 25 mg tablet Take 1 tablet by mouth once daily 30 Tab 0    cetirizine (ZYRTEC) 10 mg tablet TAKE 1 TABLET BY MOUTH ONCE DAILY AT BEDTIME 30 Tab 2    amLODIPine (NORVASC) 10 mg tablet Take 1 tablet by mouth once daily 90 Tab 1    venlafaxine-SR (EFFEXOR-XR) 37.5 mg capsule TAKE 1 CAPSULE BY MOUTH ONCE DAILY  6    losartan (COZAAR) 100 mg tablet Take 1 Tab by mouth daily. 30 Tab 1    CHOLECALCIFEROL, VITAMIN D3, (VITAMIN D3 PO) Take  by mouth.  VIT E/VIT C/MAGNESIUM/ZINC SUL (VIT E-VIT C-MAGNESIUM-ZINC PO) Take  by mouth.  melatonin 1 mg tablet Take 1 mg by mouth nightly.  MULTIVITAMINS (MULTIPLE VITAMIN PO) Take  by mouth. One daily       Ketoconazole 2 % topical foam Apply  to affected area two (2) times a day. 100 g 0    triamcinolone acetonide (KENALOG) 0.1 % topical cream Apply  to affected area two (2) times a day. use thin layer mon to Friday only bid 30 g 0    ciprofloxacin-dexamethasone (CIPRODEX) 0.3-0.1 % otic suspension Administer 4 Drops in left ear two (2) times a day.  7.5 mL 0     Past Surgical History:   Procedure Laterality Date    ENDOSCOPY, COLON, DIAGNOSTIC      HX GYN      tubal ligation      Lab Results   Component Value Date/Time    WBC 8.1 04/12/2019 10:48 AM    HGB 10.7 (L) 04/12/2019 10:48 AM    HCT 35.6 04/12/2019 10:48 AM    PLATELET 495 22/32/0526 10:48 AM    MCV 75 (L) 04/12/2019 10:48 AM     Lab Results   Component Value Date/Time    Cholesterol, total 189 12/03/2019 08:12 AM    HDL Cholesterol 60 12/03/2019 08:12 AM    LDL, calculated 100 (H) 12/03/2019 08:12 AM    Triglyceride 145 12/03/2019 08:12 AM     Lab Results   Component Value Date/Time    GFR est non-AA 74 12/03/2019 08:12 AM    GFR est AA 86 12/03/2019 08:12 AM    Creatinine 0.88 12/03/2019 08:12 AM    BUN 8 12/03/2019 08:12 AM    Sodium 142 12/03/2019 08:12 AM    Potassium 3.3 (L) 12/03/2019 08:12 AM    Chloride 97 12/03/2019 08:12 AM    CO2 28 12/03/2019 08:12 AM        Review of Systems   Respiratory: Negative for shortness of breath. Cardiovascular: Negative for chest pain. Physical Exam  Constitutional:       General: She is not in acute distress. Appearance: Normal appearance. She is not ill-appearing, toxic-appearing or diaphoretic. HENT:      Head: Normocephalic and atraumatic. Right Ear: External ear normal.      Left Ear: External ear normal.   Eyes:      General:         Right eye: No discharge. Left eye: No discharge. Conjunctiva/sclera: Conjunctivae normal.      Pupils: Pupils are equal, round, and reactive to light. Neck:      Musculoskeletal: Normal range of motion and neck supple. Vascular: No carotid bruit. Cardiovascular:      Rate and Rhythm: Normal rate and regular rhythm. Pulses: Normal pulses. Heart sounds: Murmur (stable) present. No friction rub. No gallop. Pulmonary:      Effort: No respiratory distress. Breath sounds: Normal breath sounds. No wheezing or rales. Chest:      Chest wall: No tenderness.    Abdominal:      General: Abdomen is flat. There is no distension. Palpations: Abdomen is soft. There is no mass. Tenderness: There is no abdominal tenderness. There is no guarding or rebound. Hernia: No hernia is present. Musculoskeletal: Normal range of motion. Right lower leg: No edema. Left lower leg: No edema. Comments: eczema on legs   Skin:     General: Skin is warm and dry. Neurological:      Mental Status: She is alert and oriented to person, place, and time. Mental status is at baseline. Coordination: Coordination normal.      Gait: Gait normal.   Psychiatric:         Mood and Affect: Mood normal.         Behavior: Behavior normal.         ASSESSMENT and PLAN    ICD-10-CM ICD-9-CM    1. Physical exam       Discussed diet weight loss colonoscopy is overdue she is aware she will schedule      Mammogram is due she will schedule    Eye exam is due she will schedule    Labs ordered    Flu shot today    Tdap up-to-date       pelvic exam is up-to-date     Z00.00 V70.9 EDWARD MAMMO BI SCREENING INCL CAD      LIPID PANEL      METABOLIC PANEL, COMPREHENSIVE      CBC W/O DIFF      HEMOGLOBIN A1C WITH EAG      TSH 3RD GENERATION   2. Severe obesity (HCC)  E66.01 278.01 LIPID PANEL      METABOLIC PANEL, COMPREHENSIVE      CBC W/O DIFF   Counseled on weight loss   HEMOGLOBIN A1C WITH EAG      TSH 3RD GENERATION   3. Essential hypertension  I10 401.9 LIPID PANEL      METABOLIC PANEL, COMPREHENSIVE   Controlled on Norvasc 10 mg and losartan hydrochlorothiazide 100/25   CBC W/O DIFF      HEMOGLOBIN A1C WITH EAG      TSH 3RD GENERATION   4. IFG (impaired fasting glucose)  R73.01 790.21 LIPID PANEL   Check K9C   METABOLIC PANEL, COMPREHENSIVE      CBC W/O DIFF      HEMOGLOBIN A1C WITH EAG      TSH 3RD GENERATION   5. Dyslipidemia  E78.5 272.4 LIPID PANEL   Diet controlled   METABOLIC PANEL, COMPREHENSIVE      CBC W/O DIFF      HEMOGLOBIN A1C WITH EAG      TSH 3RD GENERATION   6.  Non-seasonal allergic rhinitis due to pollen  J30.1 477.0 LIPID PANEL   OTC Zyrtec or Allegra   METABOLIC PANEL, COMPREHENSIVE      CBC W/O DIFF      HEMOGLOBIN A1C WITH EAG      TSH 3RD GENERATION   7. Hip pain, right --improved with gabapentin nightly still with symptoms we will repeat plain film of hip and low back as well we will also get x-ray of her ankle if she twisted it recently M25.551 719.45 XR HIP RT W OR WO PELV 2-3 VWS      XR SPINE LUMB 2 OR 3 V   8. Acute right ankle pain  M25.571 719.47 XR ANKLE RT MIN 3 V     338.19    9. Hand pain, left --- has a tender spot over the base of the left thumb appears to be a cast calcium deposit in part of the normal bone anatomy we will check plain films and Ortho hand M79.642 729.5     10 hypokalemia she stopped taking her potassium supplement again we will reorder this and check labs and adjust dose as needed  11 hot flashes controlled on Effexor  Depression screen reviewed and negative      [facility], as dictated by Dr. Janis Bosch. Current diagnosis and concerns discussed with pt at length. Pt understands risks and benefits or current treatment plan and medications, and accepts the treatment and medication with any possible risks. Pt asks appropriate questions, which were answered. Pt was instructed to call with any concerns or problems. I have reviewed the note documented by the scribe. The services provided are my own.   The documentation is accurate

## 2020-10-21 ENCOUNTER — OFFICE VISIT (OUTPATIENT)
Dept: INTERNAL MEDICINE CLINIC | Age: 55
End: 2020-10-21

## 2020-10-21 VITALS
DIASTOLIC BLOOD PRESSURE: 85 MMHG | TEMPERATURE: 97.8 F | HEIGHT: 61 IN | RESPIRATION RATE: 16 BRPM | BODY MASS INDEX: 35.98 KG/M2 | HEART RATE: 89 BPM | OXYGEN SATURATION: 97 % | SYSTOLIC BLOOD PRESSURE: 128 MMHG | WEIGHT: 190.6 LBS

## 2020-10-21 DIAGNOSIS — M25.571 ACUTE RIGHT ANKLE PAIN: ICD-10-CM

## 2020-10-21 DIAGNOSIS — Z00.00 PHYSICAL EXAM: Primary | ICD-10-CM

## 2020-10-21 DIAGNOSIS — J30.1 NON-SEASONAL ALLERGIC RHINITIS DUE TO POLLEN: ICD-10-CM

## 2020-10-21 DIAGNOSIS — M79.642 HAND PAIN, LEFT: ICD-10-CM

## 2020-10-21 DIAGNOSIS — M25.551 HIP PAIN, RIGHT: ICD-10-CM

## 2020-10-21 DIAGNOSIS — R73.01 IFG (IMPAIRED FASTING GLUCOSE): ICD-10-CM

## 2020-10-21 DIAGNOSIS — Z23 NEEDS FLU SHOT: ICD-10-CM

## 2020-10-21 DIAGNOSIS — E78.5 DYSLIPIDEMIA: ICD-10-CM

## 2020-10-21 DIAGNOSIS — E66.01 SEVERE OBESITY (HCC): ICD-10-CM

## 2020-10-21 DIAGNOSIS — I10 ESSENTIAL HYPERTENSION: ICD-10-CM

## 2020-10-21 PROCEDURE — 90686 IIV4 VACC NO PRSV 0.5 ML IM: CPT

## 2020-10-21 PROCEDURE — 99396 PREV VISIT EST AGE 40-64: CPT | Performed by: INTERNAL MEDICINE

## 2020-10-21 RX ORDER — HYDROCHLOROTHIAZIDE 25 MG/1
TABLET ORAL
Qty: 90 TAB | Refills: 1 | Status: SHIPPED | OUTPATIENT
Start: 2020-10-21 | End: 2021-07-15

## 2020-10-21 RX ORDER — POTASSIUM CHLORIDE 20 MEQ/1
20 TABLET, EXTENDED RELEASE ORAL 2 TIMES DAILY
Qty: 60 TAB | Refills: 1 | Status: SHIPPED | OUTPATIENT
Start: 2020-10-21 | End: 2021-11-03 | Stop reason: SDUPTHER

## 2020-10-21 RX ORDER — CETIRIZINE HCL 10 MG
TABLET ORAL
Qty: 90 TAB | Refills: 1 | Status: SHIPPED | OUTPATIENT
Start: 2020-10-21 | End: 2022-06-17

## 2020-10-21 RX ORDER — TRIAMCINOLONE ACETONIDE 1 MG/G
CREAM TOPICAL 2 TIMES DAILY
Qty: 30 G | Refills: 0 | Status: SHIPPED | OUTPATIENT
Start: 2020-10-21 | End: 2021-11-03

## 2020-10-23 LAB
ALBUMIN SERPL-MCNC: 4.5 G/DL (ref 3.8–4.9)
ALBUMIN/GLOB SERPL: 1.6 {RATIO} (ref 1.2–2.2)
ALP SERPL-CCNC: 86 IU/L (ref 39–117)
ALT SERPL-CCNC: 21 IU/L (ref 0–32)
AST SERPL-CCNC: 20 IU/L (ref 0–40)
BILIRUB SERPL-MCNC: 0.3 MG/DL (ref 0–1.2)
BUN SERPL-MCNC: 15 MG/DL (ref 6–24)
BUN/CREAT SERPL: 17 (ref 9–23)
CALCIUM SERPL-MCNC: 10 MG/DL (ref 8.7–10.2)
CHLORIDE SERPL-SCNC: 100 MMOL/L (ref 96–106)
CHOLEST SERPL-MCNC: 200 MG/DL (ref 100–199)
CO2 SERPL-SCNC: 27 MMOL/L (ref 20–29)
CREAT SERPL-MCNC: 0.86 MG/DL (ref 0.57–1)
ERYTHROCYTE [DISTWIDTH] IN BLOOD BY AUTOMATED COUNT: 16 % (ref 11.7–15.4)
EST. AVERAGE GLUCOSE BLD GHB EST-MCNC: 120 MG/DL
GLOBULIN SER CALC-MCNC: 2.9 G/DL (ref 1.5–4.5)
GLUCOSE SERPL-MCNC: 98 MG/DL (ref 65–99)
HBA1C MFR BLD: 5.8 % (ref 4.8–5.6)
HCT VFR BLD AUTO: 39.6 % (ref 34–46.6)
HDLC SERPL-MCNC: 68 MG/DL
HGB BLD-MCNC: 11.6 G/DL (ref 11.1–15.9)
LDLC SERPL CALC-MCNC: 119 MG/DL (ref 0–99)
MCH RBC QN AUTO: 21.5 PG (ref 26.6–33)
MCHC RBC AUTO-ENTMCNC: 29.3 G/DL (ref 31.5–35.7)
MCV RBC AUTO: 73 FL (ref 79–97)
PLATELET # BLD AUTO: 404 X10E3/UL (ref 150–450)
POTASSIUM SERPL-SCNC: 3.7 MMOL/L (ref 3.5–5.2)
PROT SERPL-MCNC: 7.4 G/DL (ref 6–8.5)
RBC # BLD AUTO: 5.4 X10E6/UL (ref 3.77–5.28)
SODIUM SERPL-SCNC: 143 MMOL/L (ref 134–144)
TRIGL SERPL-MCNC: 73 MG/DL (ref 0–149)
TSH SERPL DL<=0.005 MIU/L-ACNC: 3.55 UIU/ML (ref 0.45–4.5)
VLDLC SERPL CALC-MCNC: 13 MG/DL (ref 5–40)
WBC # BLD AUTO: 8.4 X10E3/UL (ref 3.4–10.8)

## 2020-11-13 RX ORDER — AMLODIPINE BESYLATE 10 MG/1
TABLET ORAL
Qty: 90 TAB | Refills: 0 | Status: SHIPPED | OUTPATIENT
Start: 2020-11-13 | End: 2022-06-17

## 2021-01-15 DIAGNOSIS — G62.9 NEUROPATHY: ICD-10-CM

## 2021-01-15 RX ORDER — GABAPENTIN 100 MG/1
CAPSULE ORAL
Qty: 90 CAP | Refills: 0 | Status: SHIPPED | OUTPATIENT
Start: 2021-01-15 | End: 2021-07-10

## 2021-02-18 ENCOUNTER — HOSPITAL ENCOUNTER (EMERGENCY)
Age: 56
Discharge: HOME | End: 2021-02-18
Attending: EMERGENCY MEDICINE
Payer: COMMERCIAL

## 2021-02-18 VITALS
DIASTOLIC BLOOD PRESSURE: 93 MMHG | TEMPERATURE: 98.4 F | SYSTOLIC BLOOD PRESSURE: 148 MMHG | HEART RATE: 90 BPM | HEIGHT: 61 IN | WEIGHT: 188.71 LBS | RESPIRATION RATE: 18 BRPM | BODY MASS INDEX: 35.63 KG/M2 | OXYGEN SATURATION: 99 %

## 2021-02-18 DIAGNOSIS — M54.42 ACUTE LEFT-SIDED LOW BACK PAIN WITH LEFT-SIDED SCIATICA: ICD-10-CM

## 2021-02-18 PROCEDURE — 74011636637 HC RX REV CODE- 636/637: Performed by: PHYSICIAN ASSISTANT

## 2021-02-18 PROCEDURE — 99283 EMERGENCY DEPT VISIT LOW MDM: CPT

## 2021-02-18 PROCEDURE — 74011250637 HC RX REV CODE- 250/637: Performed by: PHYSICIAN ASSISTANT

## 2021-02-18 PROCEDURE — 74011250636 HC RX REV CODE- 250/636: Performed by: PHYSICIAN ASSISTANT

## 2021-02-18 PROCEDURE — 96372 THER/PROPH/DIAG INJ SC/IM: CPT

## 2021-02-18 RX ORDER — METHOCARBAMOL 750 MG/1
750 TABLET, FILM COATED ORAL
Status: COMPLETED | OUTPATIENT
Start: 2021-02-18 | End: 2021-02-18

## 2021-02-18 RX ORDER — PREDNISONE 20 MG/1
60 TABLET ORAL
Status: COMPLETED | OUTPATIENT
Start: 2021-02-18 | End: 2021-02-18

## 2021-02-18 RX ORDER — PREDNISONE 10 MG/1
TABLET ORAL
Qty: 21 TAB | Refills: 0 | Status: SHIPPED | OUTPATIENT
Start: 2021-02-18 | End: 2021-02-24

## 2021-02-18 RX ORDER — METHOCARBAMOL 750 MG/1
750 TABLET, FILM COATED ORAL 4 TIMES DAILY
Qty: 20 TAB | Refills: 0 | Status: SHIPPED | OUTPATIENT
Start: 2021-02-18 | End: 2021-11-03

## 2021-02-18 RX ORDER — KETOROLAC TROMETHAMINE 30 MG/ML
60 INJECTION, SOLUTION INTRAMUSCULAR; INTRAVENOUS
Status: COMPLETED | OUTPATIENT
Start: 2021-02-18 | End: 2021-02-18

## 2021-02-18 RX ORDER — TRAMADOL HYDROCHLORIDE 50 MG/1
50 TABLET ORAL
Qty: 20 TAB | Refills: 0 | Status: SHIPPED | OUTPATIENT
Start: 2021-02-18 | End: 2021-02-21

## 2021-02-18 RX ADMIN — PREDNISONE 60 MG: 20 TABLET ORAL at 01:48

## 2021-02-18 RX ADMIN — METHOCARBAMOL TABLETS 750 MG: 750 TABLET, COATED ORAL at 01:47

## 2021-02-18 RX ADMIN — KETOROLAC TROMETHAMINE 60 MG: 30 INJECTION, SOLUTION INTRAMUSCULAR at 01:49

## 2021-02-18 NOTE — ED NOTES
Pt discharged home with written and verbal instructions given and pt ambulated out of ED with no difficulty.

## 2021-02-18 NOTE — ED PROVIDER NOTES
EMERGENCY DEPARTMENT HISTORY AND PHYSICAL EXAM      Date: 2/18/2021  Patient Name: Meir Gaytan    History of Presenting Illness     Chief Complaint   Patient presents with    Back Pain     Pt states she has had lower back pain going down her left buttock for 2 wks. History Provided By: Patient    HPI: Meir Gaytan, 63 y.o. female with significant PMHx for HTN and chronic back pain, presents by POV to the ED with cc of left lower back pain. The pain started about 2 weeks ago. She has been taking her Gabapentin as scheduled for chronic back issues as well as applying Voltaren Gel and an over the counter menthol cream without relief. She denies fall or trauma recently and notes having multiple xrays of her back in the past could years that show arthritis. Her pain is a constant pain that worsens with movement and radiates to the left hip. She denies associated complaints. There are no other complaints, changes, or physical findings at this time. Social Hx: Tobacco (denies), EtOH (rare; social), Illicit drug use (denies)     PCP: Joey Coleman MD    No current facility-administered medications on file prior to encounter. Current Outpatient Medications on File Prior to Encounter   Medication Sig Dispense Refill    gabapentin (NEURONTIN) 100 mg capsule TAKE 1 CAPSULE BY MOUTH THREE TIMES DAILY 90 Cap 0    amLODIPine (NORVASC) 10 mg tablet Take 1 tablet by mouth once daily 90 Tab 0    potassium chloride (K-DUR, KLOR-CON) 20 mEq tablet Take 1 Tab by mouth two (2) times a day. 60 Tab 1    triamcinolone acetonide (KENALOG) 0.1 % topical cream Apply  to affected area two (2) times a day. use thin layer mon to Friday only bid 30 g 0    cetirizine (ZYRTEC) 10 mg tablet TAKE 1 TABLET BY MOUTH ONCE DAILY AT BEDTIME 90 Tab 1    hydroCHLOROthiazide (HYDRODIURIL) 25 mg tablet Take 1 tablet by mouth once daily 90 Tab 1    Ketoconazole 2 % topical foam Apply  to affected area two (2) times a day. 100 g 0    ciprofloxacin-dexamethasone (CIPRODEX) 0.3-0.1 % otic suspension Administer 4 Drops in left ear two (2) times a day. 7.5 mL 0    venlafaxine-SR (EFFEXOR-XR) 37.5 mg capsule TAKE 1 CAPSULE BY MOUTH ONCE DAILY  6    losartan (COZAAR) 100 mg tablet Take 1 Tab by mouth daily. 30 Tab 1    CHOLECALCIFEROL, VITAMIN D3, (VITAMIN D3 PO) Take  by mouth.  VIT E/VIT C/MAGNESIUM/ZINC SUL (VIT E-VIT C-MAGNESIUM-ZINC PO) Take  by mouth.  melatonin 1 mg tablet Take 1 mg by mouth nightly.  MULTIVITAMINS (MULTIPLE VITAMIN PO) Take  by mouth. One daily          Past History     Past Medical History:  Past Medical History:   Diagnosis Date    Hypertension 10/2/2009    Menopause     Microcytosis 10/2/2009       Past Surgical History:  Past Surgical History:   Procedure Laterality Date    ENDOSCOPY, COLON, DIAGNOSTIC      HX GYN      tubal ligation       Family History:  Family History   Problem Relation Age of Onset    Heart Disease Mother     Diabetes Mother     Diabetes Father     Heart Disease Father     Cancer Father         prostate and lung    Stroke Maternal Grandmother     Stroke Maternal Grandfather     Breast Cancer Maternal Aunt 79       Social History:  Social History     Tobacco Use    Smoking status: Never Smoker    Smokeless tobacco: Never Used   Substance Use Topics    Alcohol use: Yes     Comment: ocassional    Drug use: No       Allergies: Allergies   Allergen Reactions    Lisinopril Cough    Shellfish Derived Hives         Review of Systems   Review of Systems   Constitutional: Negative for chills, diaphoresis and fever. HENT: Negative for congestion, ear pain, rhinorrhea and sore throat. Respiratory: Negative for cough and shortness of breath. Cardiovascular: Negative for chest pain. Gastrointestinal: Negative for abdominal pain, constipation, diarrhea, nausea and vomiting.         Denies incontinence of bowel   Genitourinary: Negative for difficulty urinating, dysuria, frequency and hematuria. Denies urinary incontinence   Musculoskeletal: Positive for back pain and gait problem. Negative for arthralgias and myalgias. Neurological: Negative for weakness, numbness and headaches. Denies saddle paresthesias   All other systems reviewed and are negative. Physical Exam   Physical Exam  Vitals signs and nursing note reviewed. Constitutional:       General: She is not in acute distress. Appearance: She is well-developed. She is not diaphoretic. Comments: 63 y.o. -American female    HENT:      Head: Normocephalic and atraumatic. Eyes:      General:         Right eye: No discharge. Left eye: No discharge. Conjunctiva/sclera: Conjunctivae normal.   Neck:      Musculoskeletal: Normal range of motion and neck supple. Cardiovascular:      Rate and Rhythm: Normal rate and regular rhythm. Heart sounds: Normal heart sounds. No murmur. Pulmonary:      Effort: Pulmonary effort is normal. No respiratory distress. Breath sounds: Normal breath sounds. Musculoskeletal:      Comments: BACK: Normal spinal curvatures. No step off or deformity. NT to palpation. Negative seated SLR bilaterally. Strength of the LE 5/5 and equal bilaterally. Patellar DTR's 2+ bilaterally. Ambulatory without difficulty. Skin:     General: Skin is warm and dry. Neurological:      Mental Status: She is alert and oriented to person, place, and time. Psychiatric:         Behavior: Behavior normal.         Diagnostic Study Results     Labs - none    Radiologic Studies - none    Medical Decision Making   I am the first provider for this patient. I reviewed the vital signs, available nursing notes, past medical history, past surgical history, family history and social history. Vital Signs-Reviewed the patient's vital signs.   Patient Vitals for the past 12 hrs:   Temp Pulse Resp BP SpO2   02/18/21 0107 98.4 °F (36.9 °C) 90 18 (!) 148/93 99 %       Records Reviewed: Nursing Notes    Provider Notes (Medical Decision Making): The patient presents the ED with low back pain radiating to her left hip. This is consistent with sciatica. Other differential diagnosis include DDD, DJD, herniated disc, spasm, strain, urinary tract infection. Urine not checked as patient has no urinary complaints. She is already on gabapentin for chronic pain we will add prednisone, muscle relaxers, and pain medication. She should follow-up with a back specialist or primary care medicine. Vitals are stable. ED Course:   Initial assessment performed. The patients presenting problems have been discussed, and they are in agreement with the care plan formulated and outlined with them. I have encouraged them to ask questions as they arise throughout their visit. Critical Care Time: None    Disposition:  DISCHARGE NOTE:  2:06 AM  The pt is ready for discharge. The pt's signs, symptoms, diagnosis, and discharge instructions have been discussed and pt has conveyed their understanding. The pt is to follow up as recommended or return to ER should their symptoms worsen. Plan has been discussed and pt is in agreement. PLAN:  1. Current Discharge Medication List      START taking these medications    Details   predniSONE (STERAPRED DS) 10 mg dose pack Standard 6 day taper  Qty: 21 Tab, Refills: 0      traMADoL (Ultram) 50 mg tablet Take 1 Tab by mouth every six (6) hours as needed for Pain for up to 3 days. Max Daily Amount: 200 mg. Qty: 20 Tab, Refills: 0    Associated Diagnoses: Acute left-sided low back pain with left-sided sciatica      methocarbamoL (Robaxin-750) 750 mg tablet Take 1 Tab by mouth four (4) times daily. Qty: 20 Tab, Refills: 0           2. Follow-up Information    None       Return to ED if worse     Diagnosis     Clinical Impression:   1.  Acute left-sided low back pain with left-sided sciatica          Please note that this dictation was completed with Dragon, the computer voice recognition software. Quite often unanticipated grammatical, syntax, homophones, and other interpretive errors are inadvertently transcribed by the computer software. Please disregards these errors. Please excuse any errors that have escaped final proofreading.

## 2021-04-19 NOTE — PROGRESS NOTES
HISTORY OF PRESENT ILLNESS  Nathan Luo is a 63 y.o. female. HPI     Last here 10/21/20. Pt is here to f/u on chronic conditions.      Lov, She c/o knot on L hand   Reviewed xr hand 10/21/20: nl    She was in the ed 2/18/21 for back pain      BP today is 156/88, repeat 153/91  BP at home 150-170s/80s   Continues on [id number] and losartan-HCTZ 100-25mg -- took these last night   Will add toprol      Wt is 190 lbs - stable x lov  She has been eating more fried foods   Discussed diet and wt loss      Reviewed labs      Pt is on klor con 40meq per day      Pt follows with Dr. Anup Vu (ENT) prn  Last visit was 4/2017  Pt will only f/u with this physician prn   No issues     Pt previously followed with Dr. Yumiko South (hemo) to evaluate her anemia  Pt states that this was ultimately a genetic variant  Pt will only f/u with this physician prn      Pt saw Dr. Byron Diamond (derm) for routine skin checks  Pt was provided with a cream, which improved her dry skin  Pt has not seen this physician recently     Pt was started on effexor 37.5mg for hot flashes and mood swings per her gyn  This has been helping, she is happy with it  Happy with the dose 4/21     Continues on zyrtec daily for allergies      Continues on vit D OTC 25 mcg daily       prev, Pt c/o her R hips aching  She has gabapentin, this helps with pain uses as needed  She saw Dr. Sugey Van (ortho) for this   Last there 10/20 - she was given methocarbamol   Reviewed xr ankle 10/21/20: Prior syndesmotic injury. Small right ankle effusion. Incidental  right Achilles spur. Reviewed xr l spine 10/21/20: Lumbosacral facet arthropathy  Incidental fibroid uterus  Constipation  Reviewed xr hip 10/21/20: Minimal bilateral hip osteoarthritis. Erosive symphysis arthritis. DISH.   She has been using lidocaine patch which has been helping   She will be completing PT in the future     Recall ECHO 4/18/17: heart squeeze nl, no MR     PREVENTIVE:    Colonoscopy: 7/01/13, Dr. Erma Doll, repeat 5 years, due, referred, pt will schedule, reminded, pt says she will get it in , reminded again today , she is working on scheduling  Pap: Dr. Cavazos, 3/20, due reminded  Mammogram: 19, negative --due reminded  DEXA: not yet needed  Tdap: 10/11/19   Pneumovax: not yet needed  [id number]: not yet needed  Shingrix: discussed, due  Flu shot 10/20  A1c:  5.8, 1/15 5.9,  5.7,  6.0, 10/17 5.5,  5.6, 10/18 5.7,  5.8 10/20 5.8  Eye exam: [facility],  per pt, due reminded   Hep C screen: 1/15, negative  EK16, nsr   Lipids: 10/20 ldl 119   Covid: both complete (moderna)    Patient Active Problem List    Diagnosis Date Noted    IFG (impaired fasting glucose) 2019    Dyslipidemia 2019    Severe obesity ([facility] [address] 10/30/2018    Murmur 2011    Allergic rhinitis 2010    Hypertension 10/02/2009    Microcytosis 10/02/2009     Current Outpatient Medications   Medication Sig Dispense Refill    lidocaine (LIDODERM) 5 % 1 Patch by TransDERmal route every twenty-four (24) hours. Apply patch to the affected area for 12 hours a day and remove for 12 hours a day. 1 Each 2    methocarbamoL (Robaxin-750) 750 mg tablet Take 1 Tab by mouth four (4) times daily. 20 Tab 0    gabapentin (NEURONTIN) 100 mg capsule TAKE 1 CAPSULE BY MOUTH THREE TIMES DAILY 90 Cap 0    amLODIPine (NORVASC) 10 mg tablet Take 1 tablet by mouth once daily 90 Tab 0    potassium chloride (K-DUR, KLOR-CON) 20 mEq tablet Take 1 Tab by mouth two (2) times a day. 60 Tab 1    cetirizine (ZYRTEC) 10 mg tablet TAKE 1 TABLET BY MOUTH ONCE DAILY AT BEDTIME 90 Tab 1    hydroCHLOROthiazide (HYDRODIURIL) 25 mg tablet Take 1 tablet by mouth once daily 90 Tab 1    venlafaxine-SR (EFFEXOR-XR) 37.5 mg capsule TAKE 1 CAPSULE BY MOUTH ONCE DAILY  6    losartan (COZAAR) 100 mg tablet Take 1 Tab by mouth daily. 30 Tab 1    CHOLECALCIFEROL, VITAMIN D3, (VITAMIN D3 PO) Take  by mouth.       VIT E/VIT C/MAGNESIUM/ZINC SUL (VIT E-VIT C-MAGNESIUM-ZINC PO) Take  by mouth.  melatonin 1 mg tablet Take 1 mg by mouth nightly.  MULTIVITAMINS (MULTIPLE VITAMIN PO) Take  by mouth. One daily       diclofenac EC (VOLTAREN) 75 mg EC tablet TAKE 1 TABLET BY MOUTH TWICE DAILY      triamcinolone acetonide (KENALOG) 0.1 % topical cream Apply  to affected area two (2) times a day. use thin layer mon to Friday only bid 30 g 0    Ketoconazole 2 % topical foam Apply  to affected area two (2) times a day. 100 g 0    ciprofloxacin-dexamethasone (CIPRODEX) 0.3-0.1 % otic suspension Administer 4 Drops in left ear two (2) times a day. 7.5 mL 0     Past Surgical History:   Procedure Laterality Date    ENDOSCOPY, COLON, DIAGNOSTIC      HX GYN      tubal ligation      Lab Results   Component Value Date/Time    WBC 8.4 10/22/2020 08:26 AM    HGB 11.6 10/22/2020 08:26 AM    HCT 39.6 10/22/2020 08:26 AM    PLATELET 194 80/80/4286 08:26 AM    MCV 73 (L) 10/22/2020 08:26 AM     Lab Results   Component Value Date/Time    Cholesterol, total 200 (H) 10/22/2020 08:26 AM    HDL Cholesterol 68 10/22/2020 08:26 AM    LDL, calculated 119 (H) 10/22/2020 08:26 AM    LDL, calculated 100 (H) 12/03/2019 08:12 AM    Triglyceride 73 10/22/2020 08:26 AM     Lab Results   Component Value Date/Time    GFR est non-AA 76 10/22/2020 08:26 AM    GFR est AA 87 10/22/2020 08:26 AM    Creatinine 0.86 10/22/2020 08:26 AM    BUN 15 10/22/2020 08:26 AM    Sodium 143 10/22/2020 08:26 AM    Potassium 3.7 10/22/2020 08:26 AM    Chloride 100 10/22/2020 08:26 AM    CO2 27 10/22/2020 08:26 AM        Review of Systems   Respiratory: Negative for shortness of breath. Cardiovascular: Negative for chest pain. Physical Exam  Constitutional:       General: She is not in acute distress. Appearance: Normal appearance. She is not ill-appearing, toxic-appearing or diaphoretic. HENT:      Head: Normocephalic and atraumatic.       Right Ear: External ear normal. Left Ear: External ear normal.   Eyes:      General:         Right eye: No discharge. Left eye: No discharge. Conjunctiva/sclera: Conjunctivae normal.      Pupils: Pupils are equal, round, and reactive to light. Neck:      Musculoskeletal: Normal range of motion and neck supple. Cardiovascular:      Rate and Rhythm: Normal rate and regular rhythm. Pulses: Normal pulses. Heart sounds: Murmur present. No friction rub. No gallop. Pulmonary:      Effort: No respiratory distress. Breath sounds: Normal breath sounds. No wheezing or rales. Chest:      Chest wall: No tenderness. Musculoskeletal: Normal range of motion. Right lower leg: No edema. Left lower leg: No edema. Skin:     General: Skin is warm and dry. Neurological:      Mental Status: She is alert and oriented to person, place, and time. Mental status is at baseline. Coordination: Coordination normal.      Gait: Gait normal.   Psychiatric:         Mood and Affect: Mood normal.         Behavior: Behavior normal.         ASSESSMENT and PLAN    ICD-10-CM ICD-9-CM    1. Essential hypertension     Not controlled on losartan hydrochlorothiazide and Norvasc    We will add Toprol-XL 25 mg      Monitor blood pressure at home reassess in 6 weeks I10 401.9 LIPID PANEL      METABOLIC PANEL, COMPREHENSIVE      TSH 3RD GENERATION      HEMOGLOBIN A1C WITH EAG   2. Severe obesity (HCC)  E66.01 278.01 LIPID PANEL   Discussed need for diet weight loss   METABOLIC PANEL, COMPREHENSIVE      TSH 3RD GENERATION      HEMOGLOBIN A1C WITH EAG   3. Non-seasonal allergic rhinitis due to pollen  J30.1 477.0 LIPID PANEL   [facility] check W7R   METABOLIC PANEL, COMPREHENSIVE      TSH 3RD GENERATION      HEMOGLOBIN A1C WITH EAG   4. Dyslipidemia  E78.5 272.4 LIPID PANEL   Diet controlled check lipids treat as needed   METABOLIC PANEL, COMPREHENSIVE      TSH 3RD GENERATION      HEMOGLOBIN A1C WITH EAG   5.  IFG (impaired fasting glucose)  R73.01 790.21 LIPID PANEL   Check G8D   METABOLIC PANEL, COMPREHENSIVE      TSH 3RD GENERATION      HEMOGLOBIN A1C WITH EAG   6. Murmur  R01.1 785.2 LIPID PANEL   Had echocardiogram normal valves   METABOLIC PANEL, COMPREHENSIVE      TSH 3RD GENERATION      HEMOGLOBIN A1C WITH EAG   7. Hot flashes  R23.2 782.62 LIPID PANEL   Improved with Effexor continue   METABOLIC PANEL, COMPREHENSIVE      TSH 3RD GENERATION      HEMOGLOBIN A1C WITH EAG   8. Breast screening  Z12.39 V76.10 [facility] MAMMO BI SCREENING INCL CAD      LIPID PANEL   Mammogram pelvic exam are due she is working on colonoscopy   METABOLIC PANEL, COMPREHENSIVE      TSH 3RD GENERATION      HEMOGLOBIN A1C WITH EAG   9. Hip pain, right  M25.551 719.45 LIPID PANEL   Has seen orthopedic we will give lidocaine patches    Also has gabapentin   METABOLIC PANEL, COMPREHENSIVE      TSH 3RD GENERATION      HEMOGLOBIN A1C WITH EAG   10. Hypokalemia  E87.6 276.8 LIPID PANEL   On Klor-Con check level adjust dose as needed   METABOLIC PANEL, COMPREHENSIVE      TSH 3RD GENERATION      HEMOGLOBIN A1C WITH EAG      Depression screen reviewed and negative      Scribed by [address], as dictated by Dr. Jaziel Gerard. Current diagnosis and concerns discussed with pt at length. Pt understands risks and benefits or current treatment plan and medications, and accepts the treatment and medication with any possible risks. Pt asks appropriate questions, which were answered. Pt was instructed to call with any concerns or problems. I have reviewed the note documented by the scribe. The services provided are my own.   The documentation is accurate

## 2021-04-20 ENCOUNTER — OFFICE VISIT (OUTPATIENT)
Dept: INTERNAL MEDICINE CLINIC | Age: 56
End: 2021-04-20
Payer: COMMERCIAL

## 2021-04-20 VITALS
TEMPERATURE: 97.3 F | DIASTOLIC BLOOD PRESSURE: 91 MMHG | OXYGEN SATURATION: 98 % | HEART RATE: 71 BPM | HEIGHT: 61 IN | WEIGHT: 190 LBS | RESPIRATION RATE: 16 BRPM | SYSTOLIC BLOOD PRESSURE: 153 MMHG | BODY MASS INDEX: 35.87 KG/M2

## 2021-04-20 DIAGNOSIS — R23.2 HOT FLASHES: ICD-10-CM

## 2021-04-20 DIAGNOSIS — R73.01 IFG (IMPAIRED FASTING GLUCOSE): ICD-10-CM

## 2021-04-20 DIAGNOSIS — E66.01 SEVERE OBESITY (HCC): ICD-10-CM

## 2021-04-20 DIAGNOSIS — E87.6 HYPOKALEMIA: ICD-10-CM

## 2021-04-20 DIAGNOSIS — M25.551 HIP PAIN, RIGHT: ICD-10-CM

## 2021-04-20 DIAGNOSIS — J30.1 NON-SEASONAL ALLERGIC RHINITIS DUE TO POLLEN: ICD-10-CM

## 2021-04-20 DIAGNOSIS — I10 ESSENTIAL HYPERTENSION: Primary | ICD-10-CM

## 2021-04-20 DIAGNOSIS — Z12.39 BREAST SCREENING: ICD-10-CM

## 2021-04-20 DIAGNOSIS — R01.1 MURMUR: ICD-10-CM

## 2021-04-20 DIAGNOSIS — E78.5 DYSLIPIDEMIA: ICD-10-CM

## 2021-04-20 PROCEDURE — 99214 OFFICE O/P EST MOD 30 MIN: CPT | Performed by: INTERNAL MEDICINE

## 2021-04-20 RX ORDER — METOPROLOL SUCCINATE 25 MG/1
25 TABLET, EXTENDED RELEASE ORAL DAILY
Qty: 90 TAB | Refills: 0 | Status: SHIPPED | OUTPATIENT
Start: 2021-04-20 | End: 2021-07-10

## 2021-04-20 RX ORDER — LIDOCAINE 50 MG/G
1 PATCH TOPICAL EVERY 24 HOURS
Qty: 1 EACH | Refills: 2 | Status: SHIPPED | OUTPATIENT
Start: 2021-04-20 | End: 2021-11-03

## 2021-04-20 RX ORDER — DICLOFENAC SODIUM 75 MG/1
TABLET, DELAYED RELEASE ORAL
COMMUNITY
Start: 2021-04-08 | End: 2021-11-03

## 2021-11-02 NOTE — PROGRESS NOTES
HISTORY OF PRESENT ILLNESS  Eva Shen is a 61 y.o. female. HPI     Last here 4/20/21.  Pt is here to f/u on chronic conditions.     She states that 9/21 she was stung by yellowjacket and she is allergic  She went to the ER    Discussed that the patient is eligible for the covid vaccine booster 6 months after their last dose  Discussed can get at local pharmacy     BP today is controlled she feels that her blood pressure has been elevated recently but has no home readings to review and blood pressure is controlled today  BP at home is high per pt, no exact readings  Continues on -HCTZ 25mg   Now taking toprol as well  It turns out she stopped taking her Norvasc and losartan an undetermined time ago they ran out and she never refilled it which explains why she has had some fluctuating blood pressures  She states that she has only been taking toprol and hctz- discussed calling in for refills     Wt was 190 lbs lov  Discussed diet and wt loss      She got labs done this AM     Pt is on klor con 40meq per day       Pt follows with Dr. Bunny Mora (ENT) prn  Last visit was 4/2017  Pt will only f/u with this physician prn   No issues     Pt previously followed with Dr. Levy Russ (hemo) to evaluate her anemia  Pt states that this was ultimately a genetic variant  Pt will only f/u with this physician prn      Pt saw Dr. Carolynn Martinez (derm) for routine skin checks  Pt was provided with a cream, which improved her dry skin  Pt has not seen this physician recently     Pt was started on effexor 37.5mg for hot flashes and mood swings per her gyn- she did not refill this    She is taking vit c, vit e, fish oil, iron, and zinc     Continues on zyrtec daily for allergies      Continues on vit D OTC 25 mcg daily       Previously pt c/o her R hip aching  She has gabapentin, this helps with pain uses as needed  She saw Dr. Stephenie Mo (ortho) for this   Last there 10/20 - she was given methocarbamol   She has been using lidocaine patch which has been helping   She will be completing PT in the future     Reviewed colo      Recall ECHO 17: heart squeeze nl, no MR     PREVENTIVE:    Colonoscopy:  Dr. Indio Weiner, 5 year repeat per pt  Pap: Dr. Cavazos, 3/20, due reminded  Mammogram: 19, negative --due reminded  DEXA: not yet needed  Tdap: 10/11/19   Pneumovax: not yet needed  [id number]: not yet needed  Shingrix: discussed, due  Flu shot 10/20, will get today 21  A1c: 1/15 5.9,  5.7,  6.0, 10/17 5.5,  5.6, 10/18 5.7,  5.8 10/20 5.8  Eye exam: [facility],  per pt, due reminded   Hep C screen: 1/15, negative  EK16, nsr   Lipids: 10/20 ldl 119   Covid: both complete (Moderna)    Patient Active Problem List    Diagnosis Date Noted    Hip pain, right 2021    Breast screening 2021    IFG (impaired fasting glucose) 2019    Dyslipidemia 2019    Severe obesity ([address] 10/30/2018    Murmur 2011    Allergic rhinitis 2010    Hypertension 10/02/2009    Microcytosis 10/02/2009     Current Outpatient Medications   Medication Sig Dispense Refill    venlafaxine-SR (EFFEXOR-XR) 37.5 mg capsule Take 1 Capsule by mouth daily. 90 Capsule 1    hydroCHLOROthiazide (HYDRODIURIL) 25 mg tablet Take 1 tablet by mouth once daily 90 Tablet 0    metoprolol succinate (TOPROL-XL) 25 mg XL tablet Take 1 tablet by mouth once daily 90 Tablet 0    potassium chloride (K-DUR, KLOR-CON) 20 mEq tablet Take 1 Tab by mouth two (2) times a day. 60 Tab 1    cetirizine (ZYRTEC) 10 mg tablet TAKE 1 TABLET BY MOUTH ONCE DAILY AT BEDTIME 90 Tab 1    CHOLECALCIFEROL, VITAMIN D3, (VITAMIN D3 PO) Take  by mouth.       gabapentin (NEURONTIN) 100 mg capsule TAKE 1 CAPSULE BY MOUTH THREE TIMES DAILY (Patient not taking: Reported on 11/3/2021) 90 Capsule 0    amLODIPine (NORVASC) 10 mg tablet Take 1 tablet by mouth once daily (Patient not taking: Reported on 11/3/2021) 90 Tab 0    losartan (COZAAR) 100 mg tablet Take 1 Tab by mouth daily. (Patient not taking: Reported on 11/3/2021) 30 Tab 1    VIT E/VIT C/MAGNESIUM/ZINC SUL (VIT E-VIT C-MAGNESIUM-ZINC PO) Take  by mouth.  melatonin 1 mg tablet Take 1 mg by mouth nightly.  MULTIVITAMINS (MULTIPLE VITAMIN PO) Take  by mouth. One daily        Past Surgical History:   Procedure Laterality Date    ENDOSCOPY, COLON, DIAGNOSTIC      HX GYN      tubal ligation      Lab Results   Component Value Date/Time    WBC 8.4 10/22/2020 08:26 AM    HGB 11.6 10/22/2020 08:26 AM    HCT 39.6 10/22/2020 08:26 AM    PLATELET 102 16/25/6673 08:26 AM    MCV 73 (L) 10/22/2020 08:26 AM     Lab Results   Component Value Date/Time    Cholesterol, total 200 (H) 10/22/2020 08:26 AM    HDL Cholesterol 68 10/22/2020 08:26 AM    LDL, calculated 119 (H) 10/22/2020 08:26 AM    LDL, calculated 100 (H) 12/03/2019 08:12 AM    Triglyceride 73 10/22/2020 08:26 AM     Lab Results   Component Value Date/Time    GFR est non-AA 76 10/22/2020 08:26 AM    GFR est AA 87 10/22/2020 08:26 AM    Creatinine 0.86 10/22/2020 08:26 AM    BUN 15 10/22/2020 08:26 AM    Sodium 143 10/22/2020 08:26 AM    Potassium 3.7 10/22/2020 08:26 AM    Chloride 100 10/22/2020 08:26 AM    CO2 27 10/22/2020 08:26 AM        Review of Systems   Respiratory: Negative for shortness of breath. Cardiovascular: Negative for chest pain. Physical Exam  Constitutional:       General: She is not in acute distress. Appearance: Normal appearance. She is not ill-appearing, toxic-appearing or diaphoretic. HENT:      Head: Normocephalic and atraumatic. Right Ear: External ear normal.      Left Ear: External ear normal.   Eyes:      General:         Right eye: No discharge. Left eye: No discharge. Conjunctiva/sclera: Conjunctivae normal.      Pupils: Pupils are equal, round, and reactive to light. Neck:      Vascular: No carotid bruit. Cardiovascular:      Rate and Rhythm: Normal rate and regular rhythm.       Heart sounds: Murmur heard. No friction rub. No gallop. Pulmonary:      Effort: No respiratory distress. Breath sounds: Normal breath sounds. No wheezing or rales. Chest:      Chest wall: No tenderness. Musculoskeletal:         General: Normal range of motion. Cervical back: Normal range of motion and neck supple. Right lower leg: No edema. Left lower leg: No edema. Skin:     General: Skin is warm and dry. Neurological:      Mental Status: She is alert and oriented to person, place, and time. Mental status is at baseline. Coordination: Coordination normal.      Gait: Gait normal.   Psychiatric:         Mood and Affect: Mood normal.         Behavior: Behavior normal.         ASSESSMENT and PLAN    ICD-10-CM ICD-9-CM    1. Physical exam        needs to work on wt/l,     flu shot today will get covid booster     due for mammo and pelvic,     colo utd,     labs this am await results     tdap utd     eye exam due   Z00.00 V70.9    2. Essential hypertension   Struggles with noncompliance with medication pt ran out of half of BP meds a while ago currently only taking toprol and hctz     Previously on losartan and Norvasc    Blood pressure actually is controlled today    We will continue current regimen    We will have her record blood pressure readings at home and bring in readings to follow-up    Encourage compliance in the future I10 401.9 HEMOGLOBIN A1C WITH EAG      TSH 3RD GENERATION      METABOLIC PANEL, COMPREHENSIVE      LIPID PANEL   3. Severe obesity ([address]   Work on portions E66.01 278.01 HEMOGLOBIN A1C WITH EAG      TSH 3RD GENERATION      METABOLIC PANEL, COMPREHENSIVE      LIPID PANEL   4. Dyslipidemia   Diet controlled await lipid results treat as needed E78.5 272.4 HEMOGLOBIN A1C WITH EAG      TSH 3RD GENERATION      METABOLIC PANEL, COMPREHENSIVE      LIPID PANEL   5.  IFG (impaired fasting glucose)   Diet controlled a1c pending R73.01 790.21 HEMOGLOBIN A1C WITH EAG      TSH 3RD GENERATION      METABOLIC PANEL, COMPREHENSIVE      LIPID PANEL   6. Murmur   Echo previously checked stable R01.1 785.2 HEMOGLOBIN A1C WITH EAG      TSH 3RD GENERATION      METABOLIC PANEL, COMPREHENSIVE      LIPID PANEL   7. Hot flashes   Restart effexor R23.2 782.62 HEMOGLOBIN A1C WITH EAG      TSH 3RD GENERATION      METABOLIC PANEL, COMPREHENSIVE      LIPID PANEL   8. Hip pain, right   Improved with turmeric M25.551 719.45 HEMOGLOBIN A1C WITH EAG      TSH 3RD GENERATION      METABOLIC PANEL, COMPREHENSIVE      LIPID PANEL   9. Hypokalemia   On klor con results pending E87.6 276.8 HEMOGLOBIN A1C WITH EAG      TSH 3RD GENERATION      METABOLIC PANEL, COMPREHENSIVE      LIPID PANEL        Scribed by Shaniqua Bundy [address], as dictated by Dr. Tone Hahn. Current diagnosis and concerns discussed with pt at length. Pt understands risks and benefits or current treatment plan and medications, and accepts the treatment and medication with any possible risks. Pt asks appropriate questions, which were answered. Pt was instructed to call with any concerns or problems. I have reviewed the note documented by the scribe. The services provided are my own.   The documentation is accurate

## 2021-11-03 ENCOUNTER — OFFICE VISIT (OUTPATIENT)
Dept: INTERNAL MEDICINE CLINIC | Age: 56
End: 2021-11-03

## 2021-11-03 VITALS
WEIGHT: 188.2 LBS | HEART RATE: 63 BPM | BODY MASS INDEX: 35.53 KG/M2 | TEMPERATURE: 97.1 F | SYSTOLIC BLOOD PRESSURE: 121 MMHG | RESPIRATION RATE: 16 BRPM | HEIGHT: 61 IN | DIASTOLIC BLOOD PRESSURE: 79 MMHG | OXYGEN SATURATION: 97 %

## 2021-11-03 DIAGNOSIS — Z23 NEEDS FLU SHOT: ICD-10-CM

## 2021-11-03 DIAGNOSIS — I10 ESSENTIAL HYPERTENSION: ICD-10-CM

## 2021-11-03 DIAGNOSIS — E66.01 SEVERE OBESITY (HCC): ICD-10-CM

## 2021-11-03 DIAGNOSIS — R01.1 MURMUR: ICD-10-CM

## 2021-11-03 DIAGNOSIS — R73.01 IFG (IMPAIRED FASTING GLUCOSE): ICD-10-CM

## 2021-11-03 DIAGNOSIS — Z00.00 PHYSICAL EXAM: Primary | ICD-10-CM

## 2021-11-03 DIAGNOSIS — M25.551 HIP PAIN, RIGHT: ICD-10-CM

## 2021-11-03 DIAGNOSIS — E78.5 DYSLIPIDEMIA: ICD-10-CM

## 2021-11-03 DIAGNOSIS — E87.6 HYPOKALEMIA: ICD-10-CM

## 2021-11-03 DIAGNOSIS — R23.2 HOT FLASHES: ICD-10-CM

## 2021-11-03 PROBLEM — Z12.39 BREAST SCREENING: Status: ACTIVE | Noted: 2021-11-03

## 2021-11-03 LAB
ALBUMIN SERPL-MCNC: 4.2 G/DL (ref 3.5–5)
ALBUMIN/GLOB SERPL: 1.1 {RATIO} (ref 1.1–2.2)
ALP SERPL-CCNC: 81 U/L (ref 45–117)
ALT SERPL-CCNC: 30 U/L (ref 12–78)
ANION GAP SERPL CALC-SCNC: 5 MMOL/L (ref 5–15)
AST SERPL-CCNC: 15 U/L (ref 15–37)
BILIRUB SERPL-MCNC: 0.4 MG/DL (ref 0.2–1)
BUN SERPL-MCNC: 22 MG/DL (ref 6–20)
BUN/CREAT SERPL: 26 (ref 12–20)
CALCIUM SERPL-MCNC: 9.8 MG/DL (ref 8.5–10.1)
CHLORIDE SERPL-SCNC: 102 MMOL/L (ref 97–108)
CHOLEST SERPL-MCNC: 207 MG/DL
CO2 SERPL-SCNC: 32 MMOL/L (ref 21–32)
CREAT SERPL-MCNC: 0.85 MG/DL (ref 0.55–1.02)
EST. AVERAGE GLUCOSE BLD GHB EST-MCNC: 114 MG/DL
GLOBULIN SER CALC-MCNC: 3.7 G/DL (ref 2–4)
GLUCOSE SERPL-MCNC: 80 MG/DL (ref 65–100)
HBA1C MFR BLD: 5.6 % (ref 4–5.6)
HDLC SERPL-MCNC: 65 MG/DL
HDLC SERPL: 3.2 {RATIO} (ref 0–5)
LDLC SERPL CALC-MCNC: 121.4 MG/DL (ref 0–100)
POTASSIUM SERPL-SCNC: 3.8 MMOL/L (ref 3.5–5.1)
PROT SERPL-MCNC: 7.9 G/DL (ref 6.4–8.2)
SODIUM SERPL-SCNC: 139 MMOL/L (ref 136–145)
TRIGL SERPL-MCNC: 103 MG/DL (ref ?–150)
TSH SERPL DL<=0.05 MIU/L-ACNC: 2.35 UIU/ML (ref 0.36–3.74)
VLDLC SERPL CALC-MCNC: 20.6 MG/DL

## 2021-11-03 PROCEDURE — 90471 IMMUNIZATION ADMIN: CPT | Performed by: INTERNAL MEDICINE

## 2021-11-03 PROCEDURE — 90686 IIV4 VACC NO PRSV 0.5 ML IM: CPT | Performed by: INTERNAL MEDICINE

## 2021-11-03 PROCEDURE — 99396 PREV VISIT EST AGE 40-64: CPT | Performed by: INTERNAL MEDICINE

## 2021-11-03 RX ORDER — HYDROCHLOROTHIAZIDE 25 MG/1
25 TABLET ORAL DAILY
Qty: 90 TABLET | Refills: 1 | Status: SHIPPED | OUTPATIENT
Start: 2021-11-03 | End: 2022-06-03

## 2021-11-03 RX ORDER — METOPROLOL SUCCINATE 25 MG/1
25 TABLET, EXTENDED RELEASE ORAL DAILY
Qty: 90 TABLET | Refills: 1 | Status: SHIPPED | OUTPATIENT
Start: 2021-11-03 | End: 2022-06-17 | Stop reason: SDUPTHER

## 2021-11-03 RX ORDER — POTASSIUM CHLORIDE 20 MEQ/1
20 TABLET, EXTENDED RELEASE ORAL 2 TIMES DAILY
Qty: 180 TABLET | Refills: 1 | Status: SHIPPED | OUTPATIENT
Start: 2021-11-03 | End: 2022-07-12

## 2021-11-03 RX ORDER — VENLAFAXINE HYDROCHLORIDE 37.5 MG/1
37.5 CAPSULE, EXTENDED RELEASE ORAL DAILY
Qty: 90 CAPSULE | Refills: 1 | Status: SHIPPED | OUTPATIENT
Start: 2021-11-03 | End: 2022-06-03

## 2021-12-03 PROBLEM — Z12.39 BREAST SCREENING: Status: RESOLVED | Noted: 2021-11-03 | Resolved: 2021-12-03

## 2022-03-19 PROBLEM — R73.01 IFG (IMPAIRED FASTING GLUCOSE): Status: ACTIVE | Noted: 2019-04-12

## 2022-03-19 PROBLEM — E66.01 SEVERE OBESITY (HCC): Status: ACTIVE | Noted: 2018-10-30

## 2022-03-19 PROBLEM — M25.551 HIP PAIN, RIGHT: Status: ACTIVE | Noted: 2021-11-03

## 2022-03-20 PROBLEM — E78.5 DYSLIPIDEMIA: Status: ACTIVE | Noted: 2019-04-12

## 2022-05-03 ENCOUNTER — TELEPHONE (OUTPATIENT)
Dept: INTERNAL MEDICINE CLINIC | Age: 57
End: 2022-05-03

## 2022-05-03 NOTE — TELEPHONE ENCOUNTER
----- Message from [address] [facility] sent at 5/2/2022  5:03 PM EDT -----  Subject: Appointment Request    Reason for Call: Semi-Routine Skin Problem    QUESTIONS  Type of Appointment? Established Patient  Reason for appointment request? Available appointments did not meet   patient need  Additional Information for Provider? Pt has lump on back by spine that is   itchy sometimes, pt noticed lump on Saturday. Please call pt with appt  ---------------------------------------------------------------------------  --------------  CALL BACK INFO  What is the best way for the office to contact you? OK to leave message on   voicemail  Preferred Call Back Phone Number? [phone]  ---------------------------------------------------------------------------  --------------  SCRIPT ANSWERS  Relationship to Patient? Self  Are you having swelling in your throat or face? No  Are you having difficulty breathing? No  Have the symptoms worsened or spread in the last day? No  Are you having fevers (100.4), chills or sweats? No  Have you recently (14 days) seen a provider for this issue? No  Have you been diagnosed with, awaiting test results for, or told that you   are suspected of having COVID-19 (Coronavirus)? (If patient has tested   negative or was tested as a requirement for work, school, or travel and   not based on symptoms, answer no)? No  Within the past 10 days have you developed any of the following symptoms   (answer no if symptoms have been present longer than 10 days or began   more than 10 days ago)? Fever or Chills, Cough, Shortness of breath or   difficulty breathing, Loss of taste or smell, Sore throat, Nasal   congestion, Sneezing or runny nose, Fatigue or generalized body aches   (answer no if pain is specific to a body part e.g. back pain), Diarrhea,   Headache? No  Have you had close contact with someone with COVID-19 in the last 7 days?    No  (Service Expert  click yes below to proceed with [employer] As Usual   Scheduling)?  Yes

## 2022-05-03 NOTE — TELEPHONE ENCOUNTER
Returned call and offered to schedule patient for a virtual visit on 5/6. Patient declined virtual and requested in office only.  Patient has been scheduled for an office visit on 5/10/22, arrival time 8:00 AM.

## 2022-05-20 LAB — HBA1C MFR BLD HPLC: 6 %

## 2022-06-03 DIAGNOSIS — I10 ESSENTIAL HYPERTENSION: ICD-10-CM

## 2022-06-03 RX ORDER — VENLAFAXINE HYDROCHLORIDE 37.5 MG/1
CAPSULE, EXTENDED RELEASE ORAL
Qty: 7 CAPSULE | Refills: 0 | Status: SHIPPED | OUTPATIENT
Start: 2022-06-03 | End: 2022-06-17 | Stop reason: SDUPTHER

## 2022-06-03 RX ORDER — HYDROCHLOROTHIAZIDE 25 MG/1
TABLET ORAL
Qty: 7 TABLET | Refills: 0 | Status: SHIPPED | OUTPATIENT
Start: 2022-06-03 | End: 2022-06-17 | Stop reason: SDUPTHER

## 2022-06-14 NOTE — PROGRESS NOTES
HISTORY OF PRESENT ILLNESS  Liban Byrnes is a 61 y.o. female. HPI     Last here 11/03/21.  Pt is here to f/u on chronic conditions. This is an established visit completed with telemedicine was completed with video assist  the patient acknowledges and agrees to this method of visitation grisel        Has a history of hypertension  BP today is 151/95 today. Per her report she has been checking her blood pressure prior to medication  Ranges between 135-145/80 after taking medication, before medication, it is elevated  States she will send her readings from last 2 weeks  Discussed that she should check it an hour after taking her medication  Continues on HCTZ 25mg and Toprol  No longer taking amlodipine 10 mg or losartan 100 mg.   Will add back 2.5 mg of amlodipine   Of note blood pressure was controlled last office visit    Wt is 185 lbs (measured at home), 5 lbs less than lov  Discussed diet and wt loss      Reviewed labs   Ordered labs, discussed getting BP check when she comes in for labs    Pt is on klor con 1 tab AM, 1 tab PM 40meq per day       Pt follows with Dr. Vincent Yao (ENT) prn  Last visit was 4/2017  Pt will only f/u with this physician prn   No issues     Pt previously followed with Dr. Moody Rosales (hemo) to evaluate her anemia  Pt states that this was ultimately a genetic variant  Pt will only f/u with this physician prn      Pt saw Dr. Prakash Martínez (derm) for routine skin checks  Pt was provided with a cream, which improved her dry skin  Pt has not seen this physician recently     Pt was started on effexor 37.5mg for hot flashes and mood swings per her gyn-she is currently taking it is working well    Ordered gabapentin last office visit for some back pain symptoms resolved she is not taking it     She is taking vit c, vit e, fish oil, iron, and zinc     Continues on zyrtec daily for allergies      Continues on vit D OTC 25 mcg daily       Previously pt c/o her R hip aching  No longer taking gabapentin  She saw Dr. Rebolledo Lab (ortho) for this   Last there 10/20 - she was given methocarbamol   She has been using lidocaine patch which has been helping   She will be completing PT in the [facility]: heart squeeze nl, no MR     PREVENTIVE:    Colonoscopy:  Dr. Cordelia Soto, 5 year repeat per pt  Pap: Dr. Cavazos, 3/20, due reminded  Mammogram: 19, negative --due reminded  DEXA: not yet needed  Tdap: 10/11/19   Pneumovax: not yet needed  [id number]: not yet needed  Shingrix: received 1st dose (unsure when), due for 2nd  Flu shot: 21  A1c:   5.6, 10/18 5.7,  5.8 10/20 5.8  5.6  Eye exam: [facility],  per pt, due reminded   Hep C screen: 1/15, negative  EK16, nsr   Lipids:     Covid: three doses (Moderna)    Patient Active Problem List    Diagnosis Date Noted    Hip pain, right 2021    IFG (impaired fasting glucose) 2019    Dyslipidemia 2019    Severe obesity ([address] 10/30/2018    Murmur 2011    Allergic rhinitis 2010    Hypertension 10/02/2009    Microcytosis 10/02/2009     Current Outpatient Medications   Medication Sig Dispense Refill    venlafaxine-SR (EFFEXOR-XR) 37.5 mg capsule Take 1 capsule by mouth once daily 7 Capsule 0    hydroCHLOROthiazide (HYDRODIURIL) 25 mg tablet Take 1 tablet by mouth once daily 7 Tablet 0    potassium chloride (K-DUR, KLOR-CON) 20 mEq tablet Take 1 Tablet by mouth two (2) times a day. 180 Tablet 1    metoprolol succinate (TOPROL-XL) 25 mg XL tablet Take 1 Tablet by mouth daily.  90 Tablet 1    gabapentin (NEURONTIN) 100 mg capsule TAKE 1 CAPSULE BY MOUTH THREE TIMES DAILY (Patient not taking: Reported on 11/3/2021) 90 Capsule 0    amLODIPine (NORVASC) 10 mg tablet Take 1 tablet by mouth once daily (Patient not taking: Reported on 11/3/2021) 90 Tab 0    cetirizine (ZYRTEC) 10 mg tablet TAKE 1 TABLET BY MOUTH ONCE DAILY AT BEDTIME 90 Tab 1    losartan (COZAAR) 100 mg tablet Take 1 Tab by mouth daily. (Patient not taking: Reported on 11/3/2021) 30 Tab 1    CHOLECALCIFEROL, VITAMIN D3, (VITAMIN D3 PO) Take  by mouth.  VIT E/VIT C/MAGNESIUM/ZINC SUL (VIT E-VIT C-MAGNESIUM-ZINC PO) Take  by mouth.  melatonin 1 mg tablet Take 1 mg by mouth nightly.  MULTIVITAMINS (MULTIPLE VITAMIN PO) Take  by mouth. One daily        Past Surgical History:   Procedure Laterality Date    ENDOSCOPY, COLON, DIAGNOSTIC      HX GYN      tubal ligation      Lab Results   Component Value Date/Time    WBC 8.4 10/22/2020 08:26 AM    HGB 11.6 10/22/2020 08:26 AM    HCT 39.6 10/22/2020 08:26 AM    PLATELET 576 36/25/2511 08:26 AM    MCV 73 (L) 10/22/2020 08:26 AM     Lab Results   Component Value Date/Time    Cholesterol, total 207 (H) 11/03/2021 10:25 AM    HDL Cholesterol 65 11/03/2021 10:25 AM    LDL, calculated 121.4 (H) 11/03/2021 10:25 AM    Triglyceride 103 11/03/2021 10:25 AM    CHOL/HDL Ratio 3.2 11/03/2021 10:25 AM     Lab Results   Component Value Date/Time    GFR est non-AA >60 11/03/2021 10:25 AM    GFR est AA >60 11/03/2021 10:25 AM    Creatinine 0.85 11/03/2021 10:25 AM    BUN 22 (H) 11/03/2021 10:25 AM    Sodium 139 11/03/2021 10:25 AM    Potassium 3.8 11/03/2021 10:25 AM    Chloride 102 11/03/2021 10:25 AM    CO2 32 11/03/2021 10:25 AM        Review of Systems   Respiratory: Negative for shortness of breath. Cardiovascular: Negative for chest pain. Physical Exam  Constitutional:       General: She is not in acute distress. Appearance: Normal appearance. She is not ill-appearing, toxic-appearing or diaphoretic. HENT:      Head: Normocephalic and atraumatic. Right Ear: External ear normal.      Left Ear: External ear normal.   Eyes:      General:         Right eye: No discharge. Left eye: No discharge. Conjunctiva/sclera: Conjunctivae normal.      Pupils: Pupils are equal, round, and reactive to light. Cardiovascular:      Rate and Rhythm: Normal rate and regular rhythm. Heart sounds: No murmur heard. No friction rub. No gallop. Pulmonary:      Effort: No respiratory distress. Breath sounds: Normal breath sounds. No wheezing or rales. Chest:      Chest wall: No tenderness. Musculoskeletal:         General: Normal range of motion. Cervical back: Normal range of motion and neck supple. Skin:     General: Skin is warm and dry. Neurological:      Mental Status: She is alert and oriented to person, place, and time. Mental status is at baseline. Coordination: Coordination normal.      Gait: Gait normal.   Psychiatric:         Mood and Affect: Mood normal.         Behavior: Behavior normal.         ASSESSMENT and PLAN    ICD-10-CM ICD-9-CM    1. Severe obesity (HCC)  E55.76 605.29 METABOLIC PANEL, BASIC      HEMOGLOBIN A1C WITH EAG      CBC W/O DIFF   Work on portions   METABOLIC PANEL, BASIC      HEMOGLOBIN A1C WITH EAG      CBC W/O DIFF   2. Essential hypertension  I10 401.9 metoprolol succinate (TOPROL-XL) 25 mg XL tablet   Poorly controlled we will add Norvasc 2.5 mg    Continue Toprol and hydrochlorothiazide check labs   hydroCHLOROthiazide (HYDRODIURIL) 25 mg tablet      METABOLIC PANEL, BASIC      HEMOGLOBIN A1C WITH EAG      CBC W/O DIFF      METABOLIC PANEL, BASIC      HEMOGLOBIN A1C WITH EAG      CBC W/O DIFF   3. Primary hypertension  F15 773.9 METABOLIC PANEL, BASIC      HEMOGLOBIN A1C WITH EAG   See above   CBC W/O DIFF      METABOLIC PANEL, BASIC      HEMOGLOBIN A1C WITH EAG      CBC W/O DIFF   4. IFG (impaired fasting glucose)  A71.59 397.59 METABOLIC PANEL, BASIC      HEMOGLOBIN A1C WITH EAG      CBC W/O DIFF   Diet controlled check O5U   METABOLIC PANEL, BASIC      HEMOGLOBIN A1C WITH EAG      CBC W/O DIFF   5. Dyslipidemia  H68.4 861.3 METABOLIC PANEL, BASIC      HEMOGLOBIN A1C WITH EAG      CBC W/O DIFF   Diet controlled reviewed last labs   METABOLIC PANEL, BASIC      HEMOGLOBIN A1C WITH EAG      CBC W/O DIFF   6.  Hypokalemia  E87.6 276.8 METABOLIC PANEL, BASIC      HEMOGLOBIN A1C WITH EAG   Controlled on Klor-Con continue   CBC W/O DIFF      METABOLIC PANEL, BASIC      HEMOGLOBIN A1C WITH EAG      CBC W/O DIFF   7. Breast screening overdue for mammogram pelvic exam discussed this discussed eye exam Z12.39 V76.10 [facility] BI SCREENING INCL CAD   Anxiety/hot flashes--well controlled on Effexor continue    Scribed by Alanna Barnett of [address], as dictated by Dr. Niyah Calvo. Current diagnosis and concerns discussed with pt at length. Pt understands risks and benefits or current treatment plan and medications, and accepts the treatment and medication with any possible risks. Pt asks appropriate questions, which were answered. Pt was instructed to call with any concerns or problems. I have reviewed the note documented by the scribe. The services provided are my own. The documentation is accurate     Sosa Dates, was evaluated through a synchronous (real-time) audio-video encounter. The patient (or guardian if applicable) is aware that this is a billable service, which includes applicable co-pays. This Virtual Visit was conducted with patient's (and/or legal guardian's) consent. The visit was conducted pursuant to the emergency declaration under the [facility], [address] [place] [address] authority and the [employer] and [employer] General Act. Patient identification was verified, and a caregiver was present when appropriate.   The patient was located at: Home: [address] [id number]  The provider was located at: Home:

## 2022-06-17 ENCOUNTER — VIRTUAL VISIT (OUTPATIENT)
Dept: INTERNAL MEDICINE CLINIC | Age: 57
End: 2022-06-17
Payer: COMMERCIAL

## 2022-06-17 DIAGNOSIS — I10 ESSENTIAL HYPERTENSION: ICD-10-CM

## 2022-06-17 DIAGNOSIS — I10 PRIMARY HYPERTENSION: ICD-10-CM

## 2022-06-17 DIAGNOSIS — E66.01 SEVERE OBESITY (HCC): Primary | ICD-10-CM

## 2022-06-17 DIAGNOSIS — E78.5 DYSLIPIDEMIA: ICD-10-CM

## 2022-06-17 DIAGNOSIS — E87.6 HYPOKALEMIA: ICD-10-CM

## 2022-06-17 DIAGNOSIS — R73.01 IFG (IMPAIRED FASTING GLUCOSE): ICD-10-CM

## 2022-06-17 DIAGNOSIS — Z12.39 BREAST SCREENING: ICD-10-CM

## 2022-06-17 PROCEDURE — 99214 OFFICE O/P EST MOD 30 MIN: CPT | Performed by: INTERNAL MEDICINE

## 2022-06-17 RX ORDER — VENLAFAXINE HYDROCHLORIDE 37.5 MG/1
37.5 CAPSULE, EXTENDED RELEASE ORAL DAILY
Qty: 90 CAPSULE | Refills: 1 | Status: SHIPPED | OUTPATIENT
Start: 2022-06-17

## 2022-06-17 RX ORDER — METOPROLOL SUCCINATE 25 MG/1
25 TABLET, EXTENDED RELEASE ORAL DAILY
Qty: 90 TABLET | Refills: 1 | Status: SHIPPED | OUTPATIENT
Start: 2022-06-17

## 2022-06-17 RX ORDER — AMLODIPINE BESYLATE 2.5 MG/1
2.5 TABLET ORAL DAILY
Qty: 90 TABLET | Refills: 1 | Status: SHIPPED | OUTPATIENT
Start: 2022-06-17

## 2022-06-17 RX ORDER — HYDROCHLOROTHIAZIDE 25 MG/1
25 TABLET ORAL DAILY
Qty: 90 TABLET | Refills: 1 | Status: SHIPPED | OUTPATIENT
Start: 2022-06-17

## 2022-12-18 DIAGNOSIS — I10 ESSENTIAL HYPERTENSION: ICD-10-CM

## 2022-12-20 NOTE — PROGRESS NOTES
HISTORY OF PRESENT ILLNESS  Vivian Khan is a 61 y.o. female. HPI  Last here vv 6/17/22. Pt is here to f/u on chronic conditions. Several complaints      Pt c/o ear fullness. Bilateral  On exam: some right-sided cerumen minimal  Recommended wax softening drops. Has a history of hypertension  BP today is 138/80  SBP at home 170s per her report  Continues on HCTZ 25mg, Toprol 25 mg, and amlodipine 2.5 mg (re-started lov)  Reports compliance w/ meds, states she has stopped consuming as much caffeine  Will increase amlodipine to 5 mg    Wt today is 187 lbs, up 2 lbs since lov   Discussed diet and wt loss      Reviewed labs   Ordered labs  Of note, she ate this AM    Recall ECHO 4/18/17: heart squeeze nl, no MR  Discussed we will repeat if progressive murmur     Pt is on klor con 40 meq 1 tab AM, 1 tab PM per day       Pt follows with Dr. Mo La (ENT) prn  Last visit was 4/2017  Pt will only f/u with this physician prn   No issues     Pt previously followed with Dr. 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 Scot Winter (derm) for routine skin checks  Pt was provided with a cream, which improved her dry skin   Pt has not seen this physician recently      Pt is taking effexor 37.5mg for hot flashes and mood swings per her gyn, working well 12/22     Has a hx of back and hip pain symptoms  She saw Dr. Romina Mccabe (ortho) for her back previously but not recently  Last there 10/20 - she was given methocarbamol  I have also rx'd gabapentin but she was not taking  States her L-sided sciatica has been worse and also notes some L knee pain as well. She has been using vortex cream, andree nuñez, heating pads. Denies bladder/bowel incontinence, fever, chills. Ordered PT. Rx'd gabapentin.   Had imaging in 2020     She is taking vit c, vit e, fish oil, iron, and zinc     Continues on zyrtec daily for allergies      Continues on vit D OTC 25 mcg daily      PREVENTIVE:    Colonoscopy:  Dr. Mina Rothman, 5 year repeat per pt  Pap: Dr. Balderas Manual, 10/22   Mammogram: 19, negative -- due reminded again   DEXA: not yet needed  Tdap: 10/11/19   Pneumovax: not yet needed  [id number]: not yet needed  Shingrix: 1st dose , 2nd dose will get today 22  Flu shot: 21, will get 22  A1c:  5.6, 10/18 5.7,  5.8 10/20 5.8  5.6  Eye exam: [facility],  per pt, due reminded   Hep C screen: 1/15, negative  EK16, nsr   Lipids:     Covid: 4 doses (Moderna)    Patient Active Problem List    Diagnosis Date Noted    Hip pain, right 2021    IFG (impaired fasting glucose) 2019    Dyslipidemia 2019    Severe obesity ([address] 10/30/2018    Murmur 2011    Allergic rhinitis 2010    Hypertension 10/02/2009    Microcytosis 10/02/2009     Current Outpatient Medications   Medication Sig Dispense Refill    potassium chloride (K-DUR, KLOR-CON M20) 20 mEq tablet Take 1 tablet by mouth twice daily 180 Tablet 0    metoprolol succinate (TOPROL-XL) 25 mg XL tablet Take 1 Tablet by mouth daily. 90 Tablet 1    venlafaxine-SR (EFFEXOR-XR) 37.5 mg capsule Take 1 Capsule by mouth daily. 90 Capsule 1    hydroCHLOROthiazide (HYDRODIURIL) 25 mg tablet Take 1 Tablet by mouth daily. 90 Tablet 1    amLODIPine (NORVASC) 2.5 mg tablet Take 1 Tablet by mouth daily. 90 Tablet 1    CHOLECALCIFEROL, VITAMIN D3, (VITAMIN D3 PO) Take  by mouth. VIT E/VIT C/MAGNESIUM/ZINC SUL (VIT E-VIT C-MAGNESIUM-ZINC PO) Take  by mouth. MULTIVITAMINS (MULTIPLE VITAMIN PO) Take  by mouth.  One daily        Past Surgical History:   Procedure Laterality Date    ENDOSCOPY, COLON, DIAGNOSTIC      HX GYN      tubal ligation      Lab Results   Component Value Date/Time    WBC 8.4 10/22/2020 08:26 AM    HGB 11.6 10/22/2020 08:26 AM    HCT 39.6 10/22/2020 08:26 AM    PLATELET 357 2359 08:26 AM    MCV 73 (L) 10/22/2020 08:26 AM Lab Results   Component Value Date/Time    Cholesterol, total 207 (H) 11/03/2021 10:25 AM    HDL Cholesterol 65 11/03/2021 10:25 AM    LDL, calculated 121.4 (H) 11/03/2021 10:25 AM    Triglyceride 103 11/03/2021 10:25 AM    CHOL/HDL Ratio 3.2 11/03/2021 10:25 AM     Lab Results   Component Value Date/Time    GFR est non-AA >60 11/03/2021 10:25 AM    GFR est AA >60 11/03/2021 10:25 AM    Creatinine 0.85 11/03/2021 10:25 AM    BUN 22 (H) 11/03/2021 10:25 AM    Sodium 139 11/03/2021 10:25 AM    Potassium 3.8 11/03/2021 10:25 AM    Chloride 102 11/03/2021 10:25 AM    CO2 32 11/03/2021 10:25 AM        Review of Systems   Respiratory:  Negative for shortness of breath. Cardiovascular:  Negative for chest pain. Musculoskeletal:  Positive for back pain (L side) and joint pain (L knee). Physical Exam  Constitutional:       General: She is not in acute distress. Appearance: She is not ill-appearing, toxic-appearing or diaphoretic. HENT:      Head: Normocephalic and atraumatic. Right Ear: Tympanic membrane, ear canal and external ear normal. There is impacted cerumen. Left Ear: Tympanic membrane, ear canal and external ear normal.   Eyes:      General:         Right eye: No discharge. Left eye: No discharge. Conjunctiva/sclera: Conjunctivae normal.      Pupils: Pupils are equal, round, and reactive to light. Cardiovascular:      Rate and Rhythm: Normal rate and regular rhythm. Heart sounds: Murmur (2/6) heard. No friction rub. No gallop. Pulmonary:      Effort: No respiratory distress. Breath sounds: Normal breath sounds. No wheezing or rales. Chest:      Chest wall: No tenderness. Musculoskeletal:         General: Normal range of motion. Cervical back: Normal.      Comments: Negative straight leg rise BL  Crepitus on L knee  5/5 strength LE BL   Skin:     General: Skin is warm and dry.    Neurological:      Mental Status: She is oriented to person, place, and time. Mental status is at baseline. Coordination: Coordination normal.      Gait: Gait normal.   Psychiatric:         Mood and Affect: Mood normal.         Behavior: Behavior normal.       ASSESSMENT and PLAN    ICD-10-CM ICD-9-CM    1. Primary hypertension  I10 401.9 LIPID PANEL      METABOLIC PANEL, COMPREHENSIVE      HEMOGLOBIN A1C WITH EAG      TSH 3RD GENERATION   Borderline control here today reports significant elevations at home    Will increase amlodipine to 5 mg daily    Continue hydrochlorothiazide and Toprol no additional changes check metabolic panel today for K creatinine sodium levels   CBC W/O DIFF      LIPID PANEL      METABOLIC PANEL, COMPREHENSIVE      HEMOGLOBIN A1C WITH EAG      TSH 3RD GENERATION      CBC W/O DIFF      2. Essential hypertension  I10 401.9 hydroCHLOROthiazide (HYDRODIURIL) 25 mg tablet      metoprolol succinate (TOPROL-XL) 25 mg XL tablet      LIPID PANEL   See above   METABOLIC PANEL, COMPREHENSIVE      HEMOGLOBIN A1C WITH EAG      TSH 3RD GENERATION      CBC W/O DIFF      LIPID PANEL      METABOLIC PANEL, COMPREHENSIVE      HEMOGLOBIN A1C WITH EAG      TSH 3RD GENERATION      CBC W/O DIFF      3. Severe obesity (HCC)  E66.01 278.01 LIPID PANEL      METABOLIC PANEL, COMPREHENSIVE      HEMOGLOBIN A1C WITH EAG      TSH 3RD GENERATION      CBC W/O DIFF   Work on portions   LIPID PANEL      METABOLIC PANEL, COMPREHENSIVE      HEMOGLOBIN A1C WITH EAG      TSH 3RD GENERATION      CBC W/O DIFF      4. IFG (impaired fasting glucose)  R73.01 790.21 LIPID PANEL      METABOLIC PANEL, COMPREHENSIVE      HEMOGLOBIN A1C WITH EAG      TSH 3RD GENERATION      CBC W/O DIFF      LIPID PANEL   Check A1c treat as needed   METABOLIC PANEL, COMPREHENSIVE      HEMOGLOBIN A1C WITH EAG      TSH 3RD GENERATION      CBC W/O DIFF      5.  Dyslipidemia  E78.5 272.4 LIPID PANEL      METABOLIC PANEL, COMPREHENSIVE      HEMOGLOBIN A1C WITH EAG      TSH 3RD GENERATION      CBC W/O DIFF      LIPID PANEL Has been diet controlled check lipids and treat further as needed   METABOLIC PANEL, COMPREHENSIVE      HEMOGLOBIN A1C WITH EAG      TSH 3RD GENERATION      CBC W/O DIFF      6. Anxiety  F41.9 300.00 LIPID PANEL      METABOLIC PANEL, COMPREHENSIVE      HEMOGLOBIN A1C WITH EAG   Controlled with Effexor 37.5 mg continue   TSH 3RD GENERATION      CBC W/O DIFF      LIPID PANEL      METABOLIC PANEL, COMPREHENSIVE      HEMOGLOBIN A1C WITH EAG      TSH 3RD GENERATION      CBC W/O DIFF      7. Hypokalemia  E87.6 276.8 LIPID PANEL      METABOLIC PANEL, COMPREHENSIVE      HEMOGLOBIN A1C WITH EAG      TSH 3RD GENERATION      CBC W/O DIFF   On Klor-Con 20 M EQ's twice daily check level and treat further as needed   LIPID PANEL      METABOLIC PANEL, COMPREHENSIVE      HEMOGLOBIN A1C WITH EAG      TSH 3RD GENERATION      CBC W/O DIFF      8. Murmur  R01.1 785. 2    Echo completed in 2017 murmur is stable we will repeat echo if murmur progresses in the future   9. Sciatica, left side  M54.32 724.3 REFERRAL TO PHYSICAL THERAPY   Will give gabapentin 100 mg nightly and the physical therapy   gabapentin (NEURONTIN) 100 mg capsule      10. Chronic pain of left knee  M25.562 719.46     G89.29 338.29    Discussed physical therapy likely arthritis if not improving will need to see orthopedics discussed this with patient     Depression screen reviewed and negative. Scribed by Eugene Fraser, as dictated by Dr. Shauna Jeans. Current diagnosis and concerns discussed with pt at length. Pt understands risks and benefits or current treatment plan and medications, and accepts the treatment and medication with any possible risks. Pt asks appropriate questions, which were answered. Pt was instructed to call with any concerns or problems. I have reviewed the note documented by the scribe. The services provided are my own. The documentation is accurate.

## 2022-12-21 ENCOUNTER — OFFICE VISIT (OUTPATIENT)
Dept: INTERNAL MEDICINE CLINIC | Age: 57
End: 2022-12-21
Payer: COMMERCIAL

## 2022-12-21 VITALS
HEART RATE: 70 BPM | RESPIRATION RATE: 16 BRPM | WEIGHT: 187 LBS | BODY MASS INDEX: 35.3 KG/M2 | SYSTOLIC BLOOD PRESSURE: 138 MMHG | OXYGEN SATURATION: 98 % | HEIGHT: 61 IN | TEMPERATURE: 98.1 F | DIASTOLIC BLOOD PRESSURE: 80 MMHG

## 2022-12-21 DIAGNOSIS — M54.32 SCIATICA, LEFT SIDE: ICD-10-CM

## 2022-12-21 DIAGNOSIS — E78.5 DYSLIPIDEMIA: ICD-10-CM

## 2022-12-21 DIAGNOSIS — F41.9 ANXIETY: ICD-10-CM

## 2022-12-21 DIAGNOSIS — M25.562 CHRONIC PAIN OF LEFT KNEE: ICD-10-CM

## 2022-12-21 DIAGNOSIS — Z00.00 PHYSICAL EXAM: Primary | ICD-10-CM

## 2022-12-21 DIAGNOSIS — R73.01 IFG (IMPAIRED FASTING GLUCOSE): ICD-10-CM

## 2022-12-21 DIAGNOSIS — I10 PRIMARY HYPERTENSION: ICD-10-CM

## 2022-12-21 DIAGNOSIS — Z23 NEEDS FLU SHOT: ICD-10-CM

## 2022-12-21 DIAGNOSIS — I10 ESSENTIAL HYPERTENSION: ICD-10-CM

## 2022-12-21 DIAGNOSIS — E66.01 SEVERE OBESITY (HCC): ICD-10-CM

## 2022-12-21 DIAGNOSIS — E87.6 HYPOKALEMIA: ICD-10-CM

## 2022-12-21 DIAGNOSIS — Z23 ENCOUNTER FOR IMMUNIZATION: ICD-10-CM

## 2022-12-21 DIAGNOSIS — R01.1 MURMUR: ICD-10-CM

## 2022-12-21 DIAGNOSIS — G89.29 CHRONIC PAIN OF LEFT KNEE: ICD-10-CM

## 2022-12-21 RX ORDER — VENLAFAXINE HYDROCHLORIDE 37.5 MG/1
37.5 CAPSULE, EXTENDED RELEASE ORAL DAILY
Qty: 90 CAPSULE | Refills: 1 | Status: SHIPPED | OUTPATIENT
Start: 2022-12-21

## 2022-12-21 RX ORDER — AMLODIPINE BESYLATE 5 MG/1
5 TABLET ORAL DAILY
Qty: 90 TABLET | Refills: 1 | Status: SHIPPED | OUTPATIENT
Start: 2022-12-21

## 2022-12-21 RX ORDER — POTASSIUM CHLORIDE 20 MEQ/1
20 TABLET, EXTENDED RELEASE ORAL 2 TIMES DAILY
Qty: 180 TABLET | Refills: 0 | Status: SHIPPED | OUTPATIENT
Start: 2022-12-21

## 2022-12-21 RX ORDER — HYDROCHLOROTHIAZIDE 25 MG/1
25 TABLET ORAL DAILY
Qty: 90 TABLET | Refills: 1 | Status: SHIPPED | OUTPATIENT
Start: 2022-12-21

## 2022-12-21 RX ORDER — AMLODIPINE BESYLATE 2.5 MG/1
2.5 TABLET ORAL DAILY
Qty: 90 TABLET | Refills: 1 | Status: CANCELLED | OUTPATIENT
Start: 2022-12-21

## 2022-12-21 RX ORDER — GABAPENTIN 100 MG/1
100 CAPSULE ORAL
Qty: 90 CAPSULE | Refills: 0 | Status: SHIPPED | OUTPATIENT
Start: 2022-12-21

## 2022-12-21 RX ORDER — METOPROLOL SUCCINATE 25 MG/1
25 TABLET, EXTENDED RELEASE ORAL DAILY
Qty: 90 TABLET | Refills: 1 | Status: SHIPPED | OUTPATIENT
Start: 2022-12-21

## 2022-12-21 NOTE — PROGRESS NOTES
Last here vv 22. Pt is here to f/u cpe      Wt today is 187 lbs, up 2 lbs since lov   Discussed diet and wt loss      Reviewed labs   Ordered labs  Of note, she ate this AM       She is taking vit c, vit e, fish oil, iron, and zinc     Continues on zyrtec daily for allergies      Continues on vit D OTC 25 mcg daily      PREVENTIVE:    Colonoscopy:  Dr. Gala Spivey, 5 year repeat per pt  Pap: Dr. Sonya Chicas, 10/22   Mammogram: 19, negative -- due reminded again   DEXA: not yet needed  Tdap: 10/11/19   Pneumovax: not yet needed  [id number]: not yet needed  Shingrix: 1st dose , 2nd dose will get today 22  Flu shot: 21, will get 22  A1c:  5.6, 10/18 5.7,  5.8 10/20 5.8  5.6  Eye exam: [facility],  per pt, due reminded   Hep C screen: 1/15, negative  EK16, nsr   Lipids:     Covid: 4 doses (Moderna)    Patient Active Problem List    Diagnosis Date Noted    Hip pain, right 2021    IFG (impaired fasting glucose) 2019    Dyslipidemia 2019    Severe obesity ([address] 10/30/2018    Murmur 2011    Allergic rhinitis 2010    Hypertension 10/02/2009    Microcytosis 10/02/2009     Current Outpatient Medications   Medication Sig Dispense Refill    potassium chloride (K-DUR, KLOR-CON M20) 20 mEq tablet Take 1 tablet by mouth twice daily 180 Tablet 0    metoprolol succinate (TOPROL-XL) 25 mg XL tablet Take 1 Tablet by mouth daily. 90 Tablet 1    venlafaxine-SR (EFFEXOR-XR) 37.5 mg capsule Take 1 Capsule by mouth daily. 90 Capsule 1    hydroCHLOROthiazide (HYDRODIURIL) 25 mg tablet Take 1 Tablet by mouth daily. 90 Tablet 1    amLODIPine (NORVASC) 2.5 mg tablet Take 1 Tablet by mouth daily. 90 Tablet 1    CHOLECALCIFEROL, VITAMIN D3, (VITAMIN D3 PO) Take  by mouth. VIT E/VIT C/MAGNESIUM/ZINC SUL (VIT E-VIT C-MAGNESIUM-ZINC PO) Take  by mouth. MULTIVITAMINS (MULTIPLE VITAMIN PO) Take  by mouth.  One daily        Past Surgical History: Procedure Laterality Date    ENDOSCOPY, COLON, DIAGNOSTIC      HX GYN      tubal ligation      Lab Results   Component Value Date/Time    WBC 8.4 10/22/2020 08:26 AM    HGB 11.6 10/22/2020 08:26 AM    HCT 39.6 10/22/2020 08:26 AM    PLATELET 900 89/17/2301 08:26 AM    MCV 73 (L) 10/22/2020 08:26 AM     Lab Results   Component Value Date/Time    Cholesterol, total 207 (H) 11/03/2021 10:25 AM    HDL Cholesterol 65 11/03/2021 10:25 AM    LDL, calculated 121.4 (H) 11/03/2021 10:25 AM    Triglyceride 103 11/03/2021 10:25 AM    CHOL/HDL Ratio 3.2 11/03/2021 10:25 AM     Lab Results   Component Value Date/Time    GFR est non-AA >60 11/03/2021 10:25 AM    GFR est AA >60 11/03/2021 10:25 AM    Creatinine 0.85 11/03/2021 10:25 AM    BUN 22 (H) 11/03/2021 10:25 AM    Sodium 139 11/03/2021 10:25 AM    Potassium 3.8 11/03/2021 10:25 AM    Chloride 102 11/03/2021 10:25 AM    CO2 32 11/03/2021 10:25 AM        Review of Systems   Respiratory:  Negative for shortness of breath. Cardiovascular:  Negative for chest pain. Musculoskeletal:  Positive for back pain (L side) and joint pain (L knee). Physical Exam  Constitutional:       General: She is not in acute distress. Appearance: She is not ill-appearing, toxic-appearing or diaphoretic. HENT:      Head: Normocephalic and atraumatic. Right Ear: Tympanic membrane, ear canal and external ear normal. There is impacted cerumen. Left Ear: Tympanic membrane, ear canal and external ear normal.   Eyes:      General:         Right eye: No discharge. Left eye: No discharge. Conjunctiva/sclera: Conjunctivae normal.      Pupils: Pupils are equal, round, and reactive to light. Cardiovascular:      Rate and Rhythm: Normal rate and regular rhythm. Heart sounds: Murmur (2/6) heard. No friction rub. No gallop. Pulmonary:      Effort: No respiratory distress. Breath sounds: Normal breath sounds. No wheezing or rales.    Chest:      Chest wall: No tenderness. Musculoskeletal:         General: Normal range of motion. Cervical back: Normal.      Comments: Negative straight leg rise BL  Crepitus on L knee  5/5 strength LE BL   Skin:     General: Skin is warm and dry. Neurological:      Mental Status: She is oriented to person, place, and time. Mental status is at baseline.       Coordination: Coordination normal.      Gait: Gait normal.   Psychiatric:         Mood and Affect: Mood normal.         Behavior: Behavior normal.         Physical exam--discussed diet and weight loss Shingrix and flu shot today still overdue for mammogram discussed this again reprinted order    Pelvic exam exam is up-to-date colonoscopy is up-to-date Tdap up-to-date  updated COVID booster is up-to-date eye exam is due labs are due

## 2022-12-21 NOTE — LETTER
12/22/2022 1:50 PM    Ms. Jerry Almeida  [address] [address]      Dear Care Provider    Please fax us the most recent colonoscopy report so that we may update the patient's records for continuity of care. Our fax number: [phone].     Patient:   Jerry Almeida  1965          Sincerely,      Nissa Dao MD

## 2022-12-21 NOTE — PROGRESS NOTES
1. \"Have you been to the ER, urgent care clinic since your last visit? Hospitalized since your last visit? \" No    2. \"Have you seen or consulted any other health care providers outside of the [address] [place] [address] since your last visit? \" No     3. For patients aged 39-70: Has the patient had a colonoscopy / FIT/ Cologuard? Yes - Care Gap present. Most recent result on file      If the patient is female:    4. For patients aged 41-77: Has the patient had a mammogram within the past 2 years? No      5. For patients aged 21-65: Has the patient had a pap smear? Yes - Care Gap present.  Most recent result on file

## 2022-12-22 LAB
ALBUMIN SERPL-MCNC: 4.5 G/DL (ref 3.8–4.9)
ALBUMIN/GLOB SERPL: 1.6 {RATIO} (ref 1.2–2.2)
ALP SERPL-CCNC: 80 IU/L (ref 44–121)
ALT SERPL-CCNC: 15 IU/L (ref 0–32)
AST SERPL-CCNC: 20 IU/L (ref 0–40)
BILIRUB SERPL-MCNC: <0.2 MG/DL (ref 0–1.2)
BUN SERPL-MCNC: 15 MG/DL (ref 6–24)
BUN/CREAT SERPL: 19 (ref 9–23)
CALCIUM SERPL-MCNC: 10.1 MG/DL (ref 8.7–10.2)
CHLORIDE SERPL-SCNC: 99 MMOL/L (ref 96–106)
CHOLEST SERPL-MCNC: 221 MG/DL (ref 100–199)
CO2 SERPL-SCNC: 27 MMOL/L (ref 20–29)
CREAT SERPL-MCNC: 0.81 MG/DL (ref 0.57–1)
EGFR: 84 ML/MIN/1.73
ERYTHROCYTE [DISTWIDTH] IN BLOOD BY AUTOMATED COUNT: 15.6 % (ref 11.7–15.4)
EST. AVERAGE GLUCOSE BLD GHB EST-MCNC: 123 MG/DL
GLOBULIN SER CALC-MCNC: 2.9 G/DL (ref 1.5–4.5)
GLUCOSE SERPL-MCNC: 72 MG/DL (ref 70–99)
HBA1C MFR BLD: 5.9 % (ref 4.8–5.6)
HCT VFR BLD AUTO: 39.5 % (ref 34–46.6)
HDLC SERPL-MCNC: 62 MG/DL
HGB BLD-MCNC: 11.7 G/DL (ref 11.1–15.9)
LDLC SERPL CALC-MCNC: 128 MG/DL (ref 0–99)
MCH RBC QN AUTO: 22.5 PG (ref 26.6–33)
MCHC RBC AUTO-ENTMCNC: 29.6 G/DL (ref 31.5–35.7)
MCV RBC AUTO: 76 FL (ref 79–97)
PLATELET # BLD AUTO: 408 X10E3/UL (ref 150–450)
POTASSIUM SERPL-SCNC: 3.8 MMOL/L (ref 3.5–5.2)
PROT SERPL-MCNC: 7.4 G/DL (ref 6–8.5)
RBC # BLD AUTO: 5.21 X10E6/UL (ref 3.77–5.28)
SODIUM SERPL-SCNC: 142 MMOL/L (ref 134–144)
TRIGL SERPL-MCNC: 177 MG/DL (ref 0–149)
TSH SERPL DL<=0.005 MIU/L-ACNC: 2.4 UIU/ML (ref 0.45–4.5)
VLDLC SERPL CALC-MCNC: 31 MG/DL (ref 5–40)
WBC # BLD AUTO: 9.5 X10E3/UL (ref 3.4–10.8)

## 2022-12-22 RX ORDER — POTASSIUM CHLORIDE 20 MEQ/1
TABLET, EXTENDED RELEASE ORAL
Qty: 180 TABLET | Refills: 0 | OUTPATIENT
Start: 2022-12-22

## 2022-12-22 RX ORDER — METOPROLOL SUCCINATE 25 MG/1
TABLET, EXTENDED RELEASE ORAL
Qty: 90 TABLET | Refills: 0 | OUTPATIENT
Start: 2022-12-22

## 2022-12-22 RX ORDER — VENLAFAXINE HYDROCHLORIDE 37.5 MG/1
CAPSULE, EXTENDED RELEASE ORAL
Qty: 90 CAPSULE | Refills: 0 | OUTPATIENT
Start: 2022-12-22

## 2022-12-22 RX ORDER — AMLODIPINE BESYLATE 2.5 MG/1
TABLET ORAL
Qty: 90 TABLET | Refills: 0 | OUTPATIENT
Start: 2022-12-22

## 2022-12-22 RX ORDER — HYDROCHLOROTHIAZIDE 25 MG/1
TABLET ORAL
Qty: 90 TABLET | Refills: 0 | OUTPATIENT
Start: 2022-12-22

## 2023-03-30 ENCOUNTER — OFFICE VISIT (OUTPATIENT)
Dept: INTERNAL MEDICINE CLINIC | Age: 58
End: 2023-03-30
Payer: COMMERCIAL

## 2023-03-30 VITALS
OXYGEN SATURATION: 99 % | DIASTOLIC BLOOD PRESSURE: 69 MMHG | TEMPERATURE: 97.2 F | RESPIRATION RATE: 16 BRPM | SYSTOLIC BLOOD PRESSURE: 121 MMHG | BODY MASS INDEX: 35.01 KG/M2 | HEIGHT: 61 IN | HEART RATE: 69 BPM | WEIGHT: 185.4 LBS

## 2023-03-30 DIAGNOSIS — G89.29 CHRONIC PAIN OF BOTH KNEES: Primary | ICD-10-CM

## 2023-03-30 DIAGNOSIS — M25.561 CHRONIC PAIN OF BOTH KNEES: Primary | ICD-10-CM

## 2023-03-30 DIAGNOSIS — M25.562 CHRONIC PAIN OF BOTH KNEES: Primary | ICD-10-CM

## 2023-03-30 PROCEDURE — 99214 OFFICE O/P EST MOD 30 MIN: CPT | Performed by: STUDENT IN AN ORGANIZED HEALTH CARE EDUCATION/TRAINING PROGRAM

## 2023-03-30 RX ORDER — ATORVASTATIN CALCIUM 20 MG/1
TABLET, FILM COATED ORAL
COMMUNITY
Start: 2023-02-17

## 2023-03-30 NOTE — PROGRESS NOTES
[facility]   Internal Medicine  [address], [address] [facility]  [address]  [place], [address] [facility]  [phone]      Primary Care Visit Note    Assessment/Plan:     Bilateral knee pain: likely osteoarthritis  - pt declines cane or other walking support. - Start OTC Tylenol as directed   - Start gabapentin   - Start PT  - discussed wt loss to improve symptoms. - if not improving with PT and above medication will need to see ortho    L foot ganglion cyst - currently asymptomatic and declines removal.    Chikis Kulkarni MD    CC:     Chief Complaint   Patient presents with    Fall     States had a fall on Monday and Wednesday. Knee Swelling     Right knee swollen from falls    Knee Pain     2/10 pain     Cyst     States knot on left foot x 1 month. HPI:     Darek Negron is a 61 y.o. female who presents for:    Pt has a approx 3 cm cyst on her L foot and pain and swelling in her knees. Pt states that has arthritis of her knees and has had x-rays in the past (though do not see them in the system, she does have x-rays indicating OA of the hip). Pt had a fall on Monday, she was sitting the office and lost her balance after he knee gave out. She has been having issues with her knees buckling and catching. She did not fall on her knees, fell on buttocks and side. She was able to walk after the event. She had another fall after that on Friday last week but she was wearing knee pads at that time and did not injury her knees. She reports that she has had swelling in her leg that comes after the end of the day at work for at least the last month. She does not have swelling today because she took the day off of work. She has been using OTC topical aspercream, heating pad. She has an Rx for gabapentin but did not start using it because she thought it was for anxiety and did not think she needs anymore anxiety medication.  She also has a referral to PT which she has not started yet, but plans to start. She has been trying to eat more healthy food to lose weight. She has been cutting down on fried foods, working on portion controls. ROS:   All 10 point review of systems negative except those mentioned in the HPI. Past Medical History: Active Ambulatory Problems     Diagnosis Date Noted    Hypertension 10/02/2009    Microcytosis 10/02/2009    Allergic rhinitis 02/05/2010    Murmur 05/27/2011    Severe obesity ([address] 10/30/2018    IFG (impaired fasting glucose) 04/12/2019    Dyslipidemia 04/12/2019    Hip pain, right 11/03/2021     Resolved Ambulatory Problems     Diagnosis Date Noted    Breast screening 11/03/2021     Past Medical History:   Diagnosis Date    Menopause           Current Medications:     Current Outpatient Medications:     atorvastatin (LIPITOR) 20 mg tablet, , Disp: , Rfl:     hydroCHLOROthiazide (HYDRODIURIL) 25 mg tablet, Take 1 Tablet by mouth daily. , Disp: 90 Tablet, Rfl: 1    metoprolol succinate (TOPROL-XL) 25 mg XL tablet, Take 1 Tablet by mouth daily. , Disp: 90 Tablet, Rfl: 1    venlafaxine-SR (EFFEXOR-XR) 37.5 mg capsule, Take 1 Capsule by mouth daily. , Disp: 90 Capsule, Rfl: 1    potassium chloride (K-DUR, KLOR-CON M20) 20 mEq tablet, Take 1 Tablet by mouth two (2) times a day., Disp: 180 Tablet, Rfl: 0    amLODIPine (NORVASC) 5 mg tablet, Take 1 Tablet by mouth daily. , Disp: 90 Tablet, Rfl: 1    CHOLECALCIFEROL, VITAMIN D3, (VITAMIN D3 PO), Take  by mouth., Disp: , Rfl:     VIT E/VIT C/MAGNESIUM/ZINC SUL (VIT E-VIT C-MAGNESIUM-ZINC PO), Take  by mouth., Disp: , Rfl:     MULTIVITAMINS (MULTIPLE VITAMIN PO), Take  by mouth. One daily , Disp: , Rfl:     gabapentin (NEURONTIN) 100 mg capsule, Take 1 Capsule by mouth nightly as needed for Pain. Max Daily Amount: 100 mg.  (Patient not taking: Reported on 3/30/2023), Disp: 90 Capsule, Rfl: 0      Past Surgical History:     Past Surgical History:   Procedure Laterality Date    ENDOSCOPY, COLON, DIAGNOSTIC      HX GYN      tubal ligation         Family History:     Family History   Problem Relation Age of Onset    Heart Disease Mother     Diabetes Mother     Diabetes Father     Heart Disease Father     Cancer Father         prostate and lung    Stroke Maternal Grandmother     Stroke Maternal Grandfather     Breast Cancer Maternal Aunt 79         Social History:     Social History     Socioeconomic History    Marital status:      Spouse name: Not on file    Number of children: Not on file    Years of education: Not on file    Highest education level: Not on file   Occupational History    Not on file   Tobacco Use    Smoking status: Never    Smokeless tobacco: Never   Vaping Use    Vaping Use: Never used   Substance and Sexual Activity    Alcohol use: Yes     Comment: ocassional    Drug use: No    Sexual activity: Not Currently   Other Topics Concern    Not on file   Social History Narrative    Not on file     Social Determinants of Health     Financial Resource Strain: Not on file   Food Insecurity: Not on file   Transportation Needs: Not on file   Physical Activity: Not on file   Stress: Not on file   Social Connections: Not on file   Intimate Partner Violence: Not on file   Housing Stability: Not on file            Visit Vitals  /69 (BP 1 Location: Left upper arm, BP Patient Position: Sitting, BP Cuff Size: Adult)   Pulse 69   Temp 97.2 °F (36.2 °C) (Temporal)   Resp 16   Ht 5' 1\" (1.549 m)   Wt 185 lb 6.4 oz (84.1 kg)   SpO2 99%   BMI 35.03 kg/m²       Physical Exam:     General - Well appearing   HEENT - eomi, non-icteric sclera  Pulm - normal wob  Cardio - hemodynamically stable  Abd - benign  Extrem - 3 cm soft subcutaneous cyst on dorsum of L foot, no tenderness, erythema. Neuro-  Alert and oriented, No focal deficits  MSK - bilateral knees without erythema or effusion and no joint laxity.  L knee with mild tenderness of on medial and lateral joint line and sonnyus.

## 2023-04-07 ENCOUNTER — NURSE TRIAGE (OUTPATIENT)
Dept: OTHER | Facility: CLINIC | Age: 58
End: 2023-04-07

## 2023-04-07 ENCOUNTER — OFFICE VISIT (OUTPATIENT)
Dept: INTERNAL MEDICINE CLINIC | Age: 58
End: 2023-04-07

## 2023-04-20 ENCOUNTER — CLINICAL SUPPORT (OUTPATIENT)
Dept: INTERNAL MEDICINE CLINIC | Age: 58
End: 2023-04-20
Payer: COMMERCIAL

## 2023-04-20 VITALS
HEART RATE: 69 BPM | TEMPERATURE: 97.2 F | OXYGEN SATURATION: 98 % | SYSTOLIC BLOOD PRESSURE: 127 MMHG | HEIGHT: 61 IN | DIASTOLIC BLOOD PRESSURE: 78 MMHG | WEIGHT: 179 LBS | BODY MASS INDEX: 33.79 KG/M2 | RESPIRATION RATE: 18 BRPM

## 2023-04-20 DIAGNOSIS — I10 ESSENTIAL HYPERTENSION: Primary | ICD-10-CM

## 2023-04-20 PROCEDURE — 99211 OFF/OP EST MAY X REQ PHY/QHP: CPT | Performed by: INTERNAL MEDICINE

## 2023-04-20 NOTE — PROGRESS NOTES
Pt presents in clinic for BP check per Dr. Olvin Carrasco. BP taken--see VS.  Visit Vitals  /78 (BP 1 Location: Left upper arm, BP Patient Position: Sitting, BP Cuff Size: Adult)   Pulse 69   Temp 97.2 °F (36.2 °C) (Temporal)   Resp 18   Ht 5' 1\" (1.549 m)   Wt 179 lb (81.2 kg)   SpO2 98%   BMI 33.82 kg/m²       Pt informed Dr. Olvin Carrasco will be notified. Pt informed if Dr. Olvin Carrasco makes any adjustments, pt will be contacted. Pt verbalized understanding of information discussed w/ no further questions at this time.

## 2023-05-30 RX ORDER — AMLODIPINE BESYLATE 5 MG/1
TABLET ORAL
Qty: 90 TABLET | Refills: 0 | Status: SHIPPED | OUTPATIENT
Start: 2023-05-30

## 2023-06-28 RX ORDER — LOSARTAN POTASSIUM 50 MG/1
TABLET ORAL
Qty: 30 TABLET | Refills: 0 | Status: SHIPPED | OUTPATIENT
Start: 2023-06-28

## 2023-07-03 ASSESSMENT — ENCOUNTER SYMPTOMS: SHORTNESS OF BREATH: 0

## 2023-07-03 NOTE — PROGRESS NOTES
HISTORY OF PRESENT ILLNESS  Niyah Mae is a 60 y.o. female. HPI    Last here 12/21/22. Pt is here to f/u on chronic conditions. Pt presents with fatty tissue lump on top of L foot x 4 month. She states it has recently begun to interfere with her work boots. We will have her see Ortho foot      Has a history of hypertension  BP today is 136/79  SBP at home 140 150 139 per her report she is not really sure did not bring the readings she will send them  Continues on HCTZ 25mg, Toprol 25 mg, and amlodipine 5 mg (increased from 2.5 lov) she is now on losartan 50 mg daily as well  Reports compliance w/ meds, states she has stopped consuming as much caffeine       Wt today is 185 lbs, down 2 lbs x lov  Discussed diet and wt loss portions weight watchers     Reviewed labs   Ordered labs        She saw Dr. Brian Fair 4/7/23  Reviewed Note:      CONTINUE HCTZ 25MG DAILY. AND POTASSIUM SUPPLEMENT. CONTINUE METOPROLOL XL 25MG ONCE A DAY  REDUCE THE AMLODIPINE BACK TO 5MG ONCE A DAY. RESUME LOSARTAN AT 50MG ONCE DAILY. MONITOR BLOOD PRESSURE AT HOME. CALL /100    She saw Dr. Komal Portillo 3/30/23   Reviewed Note:  Assessment/Plan:        Bilateral knee pain: likely osteoarthritis   - pt declines cane or other walking support. - Start OTC Tylenol as directed    - Start gabapentin    - Start PT   - discussed wt loss to improve symptoms.     - if not improving with PT and above medication will need to see ortho      L foot ganglion cyst - currently asymptomatic and declines removal.        Recall ECHO 4/18/17: heart squeeze nl, no MR  Discussed we will repeat if progressive murmur--ordered repeat echocardiogram     Pt is on klor con 40 meq     Pt follows with Dr. Saeid Emanuel (ENT) prn  Last visit was 4/2017  Pt will only f/u with this physician prn   No issues     Pt previously followed with Dr. 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

## 2023-07-05 ENCOUNTER — OFFICE VISIT (OUTPATIENT)
Age: 58
End: 2023-07-05
Payer: COMMERCIAL

## 2023-07-05 VITALS
RESPIRATION RATE: 18 BRPM | HEIGHT: 61 IN | HEART RATE: 57 BPM | SYSTOLIC BLOOD PRESSURE: 136 MMHG | WEIGHT: 185 LBS | OXYGEN SATURATION: 100 % | BODY MASS INDEX: 34.93 KG/M2 | TEMPERATURE: 97.4 F | DIASTOLIC BLOOD PRESSURE: 79 MMHG

## 2023-07-05 DIAGNOSIS — I10 PRIMARY HYPERTENSION: Primary | ICD-10-CM

## 2023-07-05 DIAGNOSIS — Z11.59 ENCOUNTER FOR HEPATITIS C SCREENING TEST FOR LOW RISK PATIENT: ICD-10-CM

## 2023-07-05 DIAGNOSIS — R23.2 HOT FLASHES: ICD-10-CM

## 2023-07-05 DIAGNOSIS — D17.20 LIPOMA OF FOOT: ICD-10-CM

## 2023-07-05 DIAGNOSIS — R71.8 MICROCYTOSIS: ICD-10-CM

## 2023-07-05 DIAGNOSIS — M54.31 SCIATICA, RIGHT SIDE: ICD-10-CM

## 2023-07-05 DIAGNOSIS — R01.1 MURMUR: ICD-10-CM

## 2023-07-05 DIAGNOSIS — Z11.4 ENCOUNTER FOR SCREENING FOR HIV: ICD-10-CM

## 2023-07-05 DIAGNOSIS — E66.01 SEVERE OBESITY (HCC): ICD-10-CM

## 2023-07-05 DIAGNOSIS — E78.5 DYSLIPIDEMIA: ICD-10-CM

## 2023-07-05 DIAGNOSIS — R73.01 IFG (IMPAIRED FASTING GLUCOSE): ICD-10-CM

## 2023-07-05 PROCEDURE — 3075F SYST BP GE 130 - 139MM HG: CPT | Performed by: INTERNAL MEDICINE

## 2023-07-05 PROCEDURE — 99214 OFFICE O/P EST MOD 30 MIN: CPT | Performed by: INTERNAL MEDICINE

## 2023-07-05 PROCEDURE — 3078F DIAST BP <80 MM HG: CPT | Performed by: INTERNAL MEDICINE

## 2023-07-05 RX ORDER — GABAPENTIN 100 MG/1
100 CAPSULE ORAL 2 TIMES DAILY PRN
Qty: 180 CAPSULE | Refills: 0 | Status: SHIPPED | OUTPATIENT
Start: 2023-07-05 | End: 2023-10-03

## 2023-07-05 RX ORDER — METOPROLOL SUCCINATE 25 MG/1
25 TABLET, EXTENDED RELEASE ORAL DAILY
Qty: 90 TABLET | Refills: 0 | Status: SHIPPED | OUTPATIENT
Start: 2023-07-05

## 2023-07-05 RX ORDER — VENLAFAXINE HYDROCHLORIDE 37.5 MG/1
37.5 CAPSULE, EXTENDED RELEASE ORAL DAILY
Qty: 90 CAPSULE | Refills: 0 | Status: SHIPPED | OUTPATIENT
Start: 2023-07-05

## 2023-07-05 RX ORDER — HYDROCHLOROTHIAZIDE 25 MG/1
25 TABLET ORAL DAILY
Qty: 90 TABLET | Refills: 0 | Status: SHIPPED | OUTPATIENT
Start: 2023-07-05

## 2023-07-05 SDOH — ECONOMIC STABILITY: FOOD INSECURITY: WITHIN THE PAST 12 MONTHS, YOU WORRIED THAT YOUR FOOD WOULD RUN OUT BEFORE YOU GOT MONEY TO BUY MORE.: NEVER TRUE

## 2023-07-05 SDOH — ECONOMIC STABILITY: INCOME INSECURITY: HOW HARD IS IT FOR YOU TO PAY FOR THE VERY BASICS LIKE FOOD, HOUSING, MEDICAL CARE, AND HEATING?: NOT HARD AT ALL

## 2023-07-05 SDOH — ECONOMIC STABILITY: FOOD INSECURITY: WITHIN THE PAST 12 MONTHS, THE FOOD YOU BOUGHT JUST DIDN'T LAST AND YOU DIDN'T HAVE MONEY TO GET MORE.: NEVER TRUE

## 2023-07-05 SDOH — ECONOMIC STABILITY: HOUSING INSECURITY
IN THE LAST 12 MONTHS, WAS THERE A TIME WHEN YOU DID NOT HAVE A STEADY PLACE TO SLEEP OR SLEPT IN A SHELTER (INCLUDING NOW)?: NO

## 2023-07-05 ASSESSMENT — PATIENT HEALTH QUESTIONNAIRE - PHQ9
2. FEELING DOWN, DEPRESSED OR HOPELESS: 0
SUM OF ALL RESPONSES TO PHQ9 QUESTIONS 1 & 2: 0
SUM OF ALL RESPONSES TO PHQ QUESTIONS 1-9: 0
1. LITTLE INTEREST OR PLEASURE IN DOING THINGS: 0
SUM OF ALL RESPONSES TO PHQ QUESTIONS 1-9: 0

## 2023-07-05 NOTE — PROGRESS NOTES
1. \"Have you been to the ER, urgent care clinic since your last visit? Hospitalized since your last visit? \" no    2. \"Have you seen or consulted any other health care providers outside of the [address] [place] [address] since your last visit? \" no     3. For patients aged 43-73: Has the patient had a colonoscopy / FIT/ Cologuard? Yes      If the patient is female:    4. For patients aged 43-66: Has the patient had a mammogram within the past 2 years? no      5.  For patients aged 21-65: Has the patient had a pap smear? yes

## 2023-07-06 DIAGNOSIS — E87.6 HYPOKALEMIA: Primary | ICD-10-CM

## 2023-07-06 LAB
ANION GAP SERPL CALC-SCNC: 4 MMOL/L (ref 5–15)
BUN SERPL-MCNC: 26 MG/DL (ref 6–20)
BUN/CREAT SERPL: 32 (ref 12–20)
CALCIUM SERPL-MCNC: 10.1 MG/DL (ref 8.5–10.1)
CHLORIDE SERPL-SCNC: 104 MMOL/L (ref 97–108)
CO2 SERPL-SCNC: 32 MMOL/L (ref 21–32)
CREAT SERPL-MCNC: 0.81 MG/DL (ref 0.55–1.02)
EST. AVERAGE GLUCOSE BLD GHB EST-MCNC: 103 MG/DL
GLUCOSE SERPL-MCNC: 73 MG/DL (ref 65–100)
HBA1C MFR BLD: 5.2 % (ref 4–5.6)
HCV AB SERPL QL IA: NONREACTIVE
HIV 1+2 AB+HIV1 P24 AG SERPL QL IA: NONREACTIVE
HIV 1/2 RESULT COMMENT: NORMAL
POTASSIUM SERPL-SCNC: 3.3 MMOL/L (ref 3.5–5.1)
SODIUM SERPL-SCNC: 140 MMOL/L (ref 136–145)

## 2023-09-14 RX ORDER — LOSARTAN POTASSIUM 50 MG/1
TABLET ORAL
Qty: 30 TABLET | Refills: 0 | Status: SHIPPED | OUTPATIENT
Start: 2023-09-14 | End: 2023-11-01

## 2023-09-26 RX ORDER — AMLODIPINE BESYLATE 5 MG/1
TABLET ORAL
Qty: 90 TABLET | Refills: 0 | Status: SHIPPED | OUTPATIENT
Start: 2023-09-26

## 2023-09-29 RX ORDER — POTASSIUM CHLORIDE 20 MEQ/1
TABLET, EXTENDED RELEASE ORAL
Qty: 180 TABLET | Refills: 0 | Status: SHIPPED | OUTPATIENT
Start: 2023-09-29

## 2023-09-29 RX ORDER — METOPROLOL SUCCINATE 25 MG/1
25 TABLET, EXTENDED RELEASE ORAL DAILY
Qty: 90 TABLET | Refills: 0 | Status: SHIPPED | OUTPATIENT
Start: 2023-09-29

## 2023-09-29 RX ORDER — HYDROCHLOROTHIAZIDE 25 MG/1
25 TABLET ORAL DAILY
Qty: 90 TABLET | Refills: 0 | Status: SHIPPED | OUTPATIENT
Start: 2023-09-29

## 2023-09-29 RX ORDER — VENLAFAXINE HYDROCHLORIDE 37.5 MG/1
37.5 CAPSULE, EXTENDED RELEASE ORAL DAILY
Qty: 90 CAPSULE | Refills: 0 | Status: SHIPPED | OUTPATIENT
Start: 2023-09-29

## 2023-11-01 RX ORDER — LOSARTAN POTASSIUM 50 MG/1
TABLET ORAL
Qty: 90 TABLET | Refills: 0 | Status: SHIPPED | OUTPATIENT
Start: 2023-11-01 | End: 2024-01-02 | Stop reason: SDUPTHER

## 2023-11-20 ENCOUNTER — HOSPITAL ENCOUNTER (OUTPATIENT)
Facility: HOSPITAL | Age: 58
Discharge: HOME | End: 2023-11-23
Attending: INTERNAL MEDICINE
Payer: COMMERCIAL

## 2023-11-20 VITALS — BODY MASS INDEX: 34.93 KG/M2 | WEIGHT: 185 LBS | HEIGHT: 61 IN

## 2023-11-20 DIAGNOSIS — Z12.31 VISIT FOR SCREENING MAMMOGRAM: ICD-10-CM

## 2023-11-20 PROCEDURE — 77067 SCR MAMMO BI INCL CAD: CPT

## 2023-12-21 RX ORDER — AMLODIPINE BESYLATE 5 MG/1
TABLET ORAL
Qty: 30 TABLET | Refills: 0 | Status: SHIPPED | OUTPATIENT
Start: 2023-12-21 | End: 2024-01-02 | Stop reason: SDUPTHER

## 2024-01-02 ENCOUNTER — OFFICE VISIT (OUTPATIENT)
Age: 59
End: 2024-01-02
Payer: COMMERCIAL

## 2024-01-02 VITALS
RESPIRATION RATE: 18 BRPM | BODY MASS INDEX: 35.73 KG/M2 | DIASTOLIC BLOOD PRESSURE: 64 MMHG | SYSTOLIC BLOOD PRESSURE: 136 MMHG | HEART RATE: 64 BPM | OXYGEN SATURATION: 99 % | HEIGHT: 60 IN | TEMPERATURE: 98.2 F | WEIGHT: 182 LBS

## 2024-01-02 DIAGNOSIS — E87.6 HYPOKALEMIA: ICD-10-CM

## 2024-01-02 DIAGNOSIS — I10 PRIMARY HYPERTENSION: Primary | ICD-10-CM

## 2024-01-02 DIAGNOSIS — M54.32 SCIATICA, LEFT SIDE: ICD-10-CM

## 2024-01-02 DIAGNOSIS — R73.01 IFG (IMPAIRED FASTING GLUCOSE): ICD-10-CM

## 2024-01-02 DIAGNOSIS — Z00.00 PHYSICAL EXAM: ICD-10-CM

## 2024-01-02 DIAGNOSIS — Z23 NEEDS FLU SHOT: ICD-10-CM

## 2024-01-02 DIAGNOSIS — M25.561 CHRONIC PAIN OF RIGHT KNEE: ICD-10-CM

## 2024-01-02 DIAGNOSIS — E78.5 DYSLIPIDEMIA: ICD-10-CM

## 2024-01-02 DIAGNOSIS — E66.01 SEVERE OBESITY (HCC): ICD-10-CM

## 2024-01-02 DIAGNOSIS — G89.29 CHRONIC PAIN OF RIGHT KNEE: ICD-10-CM

## 2024-01-02 LAB
ALBUMIN SERPL-MCNC: 3.9 G/DL (ref 3.5–5)
ALBUMIN/GLOB SERPL: 1.1 (ref 1.1–2.2)
ALP SERPL-CCNC: 85 U/L (ref 45–117)
ALT SERPL-CCNC: 31 U/L (ref 12–78)
ANION GAP SERPL CALC-SCNC: 4 MMOL/L (ref 5–15)
AST SERPL-CCNC: 15 U/L (ref 15–37)
BILIRUB SERPL-MCNC: 0.3 MG/DL (ref 0.2–1)
BUN SERPL-MCNC: 17 MG/DL (ref 6–20)
BUN/CREAT SERPL: 23 (ref 12–20)
CALCIUM SERPL-MCNC: 9.3 MG/DL (ref 8.5–10.1)
CHLORIDE SERPL-SCNC: 106 MMOL/L (ref 97–108)
CHOLEST SERPL-MCNC: 157 MG/DL
CO2 SERPL-SCNC: 31 MMOL/L (ref 21–32)
CREAT SERPL-MCNC: 0.74 MG/DL (ref 0.55–1.02)
ERYTHROCYTE [DISTWIDTH] IN BLOOD BY AUTOMATED COUNT: 16.7 % (ref 11.5–14.5)
EST. AVERAGE GLUCOSE BLD GHB EST-MCNC: 114 MG/DL
GLOBULIN SER CALC-MCNC: 3.7 G/DL (ref 2–4)
GLUCOSE SERPL-MCNC: 96 MG/DL (ref 65–100)
HBA1C MFR BLD: 5.6 % (ref 4–5.6)
HCT VFR BLD AUTO: 37.3 % (ref 35–47)
HDLC SERPL-MCNC: 69 MG/DL
HDLC SERPL: 2.3 (ref 0–5)
HGB BLD-MCNC: 11.3 G/DL (ref 11.5–16)
LDLC SERPL CALC-MCNC: 62.8 MG/DL (ref 0–100)
MCH RBC QN AUTO: 22.8 PG (ref 26–34)
MCHC RBC AUTO-ENTMCNC: 30.3 G/DL (ref 30–36.5)
MCV RBC AUTO: 75.2 FL (ref 80–99)
NRBC # BLD: 0 K/UL (ref 0–0.01)
NRBC BLD-RTO: 0 PER 100 WBC
PLATELET # BLD AUTO: 363 K/UL (ref 150–400)
PMV BLD AUTO: 9.8 FL (ref 8.9–12.9)
POTASSIUM SERPL-SCNC: 3.9 MMOL/L (ref 3.5–5.1)
PROT SERPL-MCNC: 7.6 G/DL (ref 6.4–8.2)
RBC # BLD AUTO: 4.96 M/UL (ref 3.8–5.2)
SODIUM SERPL-SCNC: 141 MMOL/L (ref 136–145)
TRIGL SERPL-MCNC: 126 MG/DL
TSH SERPL DL<=0.05 MIU/L-ACNC: 2.32 UIU/ML (ref 0.36–3.74)
VLDLC SERPL CALC-MCNC: 25.2 MG/DL
WBC # BLD AUTO: 9.2 K/UL (ref 3.6–11)

## 2024-01-02 PROCEDURE — 90471 IMMUNIZATION ADMIN: CPT | Performed by: INTERNAL MEDICINE

## 2024-01-02 PROCEDURE — 3078F DIAST BP <80 MM HG: CPT | Performed by: INTERNAL MEDICINE

## 2024-01-02 PROCEDURE — 90674 CCIIV4 VAC NO PRSV 0.5 ML IM: CPT | Performed by: INTERNAL MEDICINE

## 2024-01-02 PROCEDURE — 99396 PREV VISIT EST AGE 40-64: CPT | Performed by: INTERNAL MEDICINE

## 2024-01-02 PROCEDURE — 3075F SYST BP GE 130 - 139MM HG: CPT | Performed by: INTERNAL MEDICINE

## 2024-01-02 RX ORDER — LOSARTAN POTASSIUM 50 MG/1
50 TABLET ORAL DAILY
Qty: 90 TABLET | Refills: 1 | Status: SHIPPED | OUTPATIENT
Start: 2024-01-02

## 2024-01-02 RX ORDER — POTASSIUM CHLORIDE 20 MEQ/1
TABLET, EXTENDED RELEASE ORAL
Qty: 270 TABLET | Refills: 1 | Status: SHIPPED | OUTPATIENT
Start: 2024-01-02

## 2024-01-02 RX ORDER — AMLODIPINE BESYLATE 5 MG/1
5 TABLET ORAL DAILY
Qty: 90 TABLET | Refills: 1 | Status: SHIPPED | OUTPATIENT
Start: 2024-01-02

## 2024-01-02 RX ORDER — HYDROCHLOROTHIAZIDE 25 MG/1
25 TABLET ORAL DAILY
Qty: 90 TABLET | Refills: 1 | Status: SHIPPED | OUTPATIENT
Start: 2024-01-02

## 2024-01-02 RX ORDER — VENLAFAXINE HYDROCHLORIDE 37.5 MG/1
37.5 CAPSULE, EXTENDED RELEASE ORAL DAILY
Qty: 90 CAPSULE | Refills: 1 | Status: SHIPPED | OUTPATIENT
Start: 2024-01-02

## 2024-01-02 RX ORDER — METOPROLOL SUCCINATE 25 MG/1
25 TABLET, EXTENDED RELEASE ORAL DAILY
Qty: 90 TABLET | Refills: 1 | Status: SHIPPED | OUTPATIENT
Start: 2024-01-02

## 2024-01-02 RX ORDER — GABAPENTIN 100 MG/1
100 CAPSULE ORAL 2 TIMES DAILY PRN
Qty: 180 CAPSULE | Refills: 0 | Status: SHIPPED | OUTPATIENT
Start: 2024-01-02 | End: 2024-04-01

## 2024-01-02 RX ORDER — VENLAFAXINE HYDROCHLORIDE 37.5 MG/1
37.5 CAPSULE, EXTENDED RELEASE ORAL DAILY
Qty: 90 CAPSULE | Refills: 0 | Status: SHIPPED | OUTPATIENT
Start: 2024-01-02

## 2024-01-02 RX ORDER — HYDROCHLOROTHIAZIDE 25 MG/1
25 TABLET ORAL DAILY
Qty: 90 TABLET | Refills: 0 | Status: SHIPPED | OUTPATIENT
Start: 2024-01-02

## 2024-01-02 RX ORDER — POTASSIUM CHLORIDE 20 MEQ/1
TABLET, EXTENDED RELEASE ORAL
Qty: 180 TABLET | Refills: 1 | Status: SHIPPED | OUTPATIENT
Start: 2024-01-02 | End: 2024-01-02 | Stop reason: SDUPTHER

## 2024-01-02 RX ORDER — POTASSIUM CHLORIDE 20 MEQ/1
TABLET, EXTENDED RELEASE ORAL
Qty: 180 TABLET | Refills: 0 | Status: SHIPPED | OUTPATIENT
Start: 2024-01-02

## 2024-01-02 RX ORDER — METOPROLOL SUCCINATE 25 MG/1
25 TABLET, EXTENDED RELEASE ORAL DAILY
Qty: 90 TABLET | Refills: 0 | Status: SHIPPED | OUTPATIENT
Start: 2024-01-02

## 2024-01-02 SDOH — ECONOMIC STABILITY: FOOD INSECURITY: WITHIN THE PAST 12 MONTHS, THE FOOD YOU BOUGHT JUST DIDN'T LAST AND YOU DIDN'T HAVE MONEY TO GET MORE.: NEVER TRUE

## 2024-01-02 SDOH — ECONOMIC STABILITY: INCOME INSECURITY: HOW HARD IS IT FOR YOU TO PAY FOR THE VERY BASICS LIKE FOOD, HOUSING, MEDICAL CARE, AND HEATING?: NOT HARD AT ALL

## 2024-01-02 SDOH — ECONOMIC STABILITY: FOOD INSECURITY: WITHIN THE PAST 12 MONTHS, YOU WORRIED THAT YOUR FOOD WOULD RUN OUT BEFORE YOU GOT MONEY TO BUY MORE.: NEVER TRUE

## 2024-01-02 ASSESSMENT — PATIENT HEALTH QUESTIONNAIRE - PHQ9
SUM OF ALL RESPONSES TO PHQ QUESTIONS 1-9: 0
SUM OF ALL RESPONSES TO PHQ QUESTIONS 1-9: 0
SUM OF ALL RESPONSES TO PHQ9 QUESTIONS 1 & 2: 0
SUM OF ALL RESPONSES TO PHQ QUESTIONS 1-9: 0
1. LITTLE INTEREST OR PLEASURE IN DOING THINGS: 0
2. FEELING DOWN, DEPRESSED OR HOPELESS: 0
SUM OF ALL RESPONSES TO PHQ QUESTIONS 1-9: 0

## 2024-01-02 NOTE — PROGRESS NOTES
\"Have you been to the ER, urgent care clinic since your last visit?  Hospitalized since your last visit?\"    NO    “Have you seen or consulted any other health care providers outside of [facility] since your last visit?”    NO    “Have you had a colorectal cancer screening such as a colonoscopy/FIT/Cologuard?    NO         
12/21/2022    HDL 62 12/21/2022    LDLCALC 128 (H) 12/21/2022     Lab Results   Component Value Date    CREATININE 0.81 07/05/2023    BUN 26 (H) 07/05/2023     07/05/2023    K 3.3 (L) 07/05/2023     07/05/2023    CO2 32 07/05/2023       Review of Systems   Respiratory:  Negative for shortness of breath.    Cardiovascular:  Negative for chest pain.         Physical Exam  Constitutional:       General: She is not in acute distress.     Appearance: She is not ill-appearing, toxic-appearing or diaphoretic.   HENT:      Head: Normocephalic and atraumatic.      Right Ear: Tympanic membrane and ear canal normal.      Left Ear: Tympanic membrane and ear canal normal.   Eyes:      General:         Right eye: No discharge.         Left eye: No discharge.      Extraocular Movements: Extraocular movements intact.   Neck:      Vascular: No carotid bruit.   Cardiovascular:      Rate and Rhythm: Normal rate and regular rhythm.      Heart sounds: Normal heart sounds. No murmur heard.  Pulmonary:      Effort: Pulmonary effort is normal. No respiratory distress.      Breath sounds: Normal breath sounds. No wheezing.   Abdominal:      Palpations: Abdomen is soft.      Tenderness: There is no abdominal tenderness.   Musculoskeletal:         General: No swelling, tenderness or deformity.      Cervical back: Normal range of motion and neck supple. No tenderness.      Right lower leg: No edema.      Left lower leg: No edema.   Lymphadenopathy:      Cervical: No cervical adenopathy.   Skin:     General: Skin is warm and dry.      Findings: No erythema.      Comments: 2in lipoma on the back   Neurological:      General: No focal deficit present.      Mental Status: She is alert and oriented to person, place, and time. Mental status is at baseline.      Gait: Gait normal.   Psychiatric:         Mood and Affect: Mood normal.           ASSESSMENT and PLAN   Diagnosis Orders   1. Primary hypertension  gabapentin (NEURONTIN) 100 MG

## 2024-01-04 LAB
FERRITIN SERPL-MCNC: 267 NG/ML (ref 26–388)
IRON SERPL-MCNC: 67 UG/DL (ref 35–150)

## 2024-06-30 DIAGNOSIS — I10 PRIMARY HYPERTENSION: ICD-10-CM

## 2024-06-30 DIAGNOSIS — E78.5 DYSLIPIDEMIA: ICD-10-CM

## 2024-06-30 DIAGNOSIS — E66.01 SEVERE OBESITY (HCC): ICD-10-CM

## 2024-06-30 DIAGNOSIS — R73.01 IFG (IMPAIRED FASTING GLUCOSE): ICD-10-CM

## 2024-07-01 RX ORDER — GABAPENTIN 100 MG/1
CAPSULE ORAL
Qty: 180 CAPSULE | Refills: 0 | Status: SHIPPED | OUTPATIENT
Start: 2024-07-01 | End: 2024-09-29

## 2024-07-08 ENCOUNTER — OFFICE VISIT (OUTPATIENT)
Age: 59
End: 2024-07-08
Payer: COMMERCIAL

## 2024-07-08 VITALS
HEART RATE: 73 BPM | SYSTOLIC BLOOD PRESSURE: 132 MMHG | WEIGHT: 184.2 LBS | TEMPERATURE: 98.3 F | DIASTOLIC BLOOD PRESSURE: 82 MMHG | HEIGHT: 60 IN | OXYGEN SATURATION: 98 % | RESPIRATION RATE: 15 BRPM | BODY MASS INDEX: 36.16 KG/M2

## 2024-07-08 DIAGNOSIS — R01.1 MURMUR: ICD-10-CM

## 2024-07-08 DIAGNOSIS — M54.31 SCIATICA, RIGHT SIDE: ICD-10-CM

## 2024-07-08 DIAGNOSIS — E78.5 DYSLIPIDEMIA: ICD-10-CM

## 2024-07-08 DIAGNOSIS — E66.01 SEVERE OBESITY (HCC): ICD-10-CM

## 2024-07-08 DIAGNOSIS — E87.6 HYPOKALEMIA: ICD-10-CM

## 2024-07-08 DIAGNOSIS — G89.29 CHRONIC PAIN OF RIGHT KNEE: ICD-10-CM

## 2024-07-08 DIAGNOSIS — M25.561 CHRONIC PAIN OF RIGHT KNEE: ICD-10-CM

## 2024-07-08 DIAGNOSIS — R71.8 MICROCYTOSIS: ICD-10-CM

## 2024-07-08 DIAGNOSIS — I10 PRIMARY HYPERTENSION: Primary | ICD-10-CM

## 2024-07-08 DIAGNOSIS — R73.01 IFG (IMPAIRED FASTING GLUCOSE): ICD-10-CM

## 2024-07-08 PROCEDURE — 3075F SYST BP GE 130 - 139MM HG: CPT | Performed by: INTERNAL MEDICINE

## 2024-07-08 PROCEDURE — 99214 OFFICE O/P EST MOD 30 MIN: CPT | Performed by: INTERNAL MEDICINE

## 2024-07-08 PROCEDURE — 3079F DIAST BP 80-89 MM HG: CPT | Performed by: INTERNAL MEDICINE

## 2024-07-08 RX ORDER — ATORVASTATIN CALCIUM 20 MG/1
10 TABLET, FILM COATED ORAL DAILY
COMMUNITY
Start: 2023-02-17

## 2024-07-08 RX ORDER — EPINEPHRINE 0.3 MG/.3ML
0.3 INJECTION SUBCUTANEOUS ONCE
Qty: 0.3 ML | Refills: 0 | Status: SHIPPED | OUTPATIENT
Start: 2024-07-08 | End: 2024-07-08

## 2024-07-08 ASSESSMENT — PATIENT HEALTH QUESTIONNAIRE - PHQ9
2. FEELING DOWN, DEPRESSED OR HOPELESS: NOT AT ALL
SUM OF ALL RESPONSES TO PHQ QUESTIONS 1-9: 0
SUM OF ALL RESPONSES TO PHQ QUESTIONS 1-9: 0
SUM OF ALL RESPONSES TO PHQ9 QUESTIONS 1 & 2: 0
SUM OF ALL RESPONSES TO PHQ QUESTIONS 1-9: 0
SUM OF ALL RESPONSES TO PHQ QUESTIONS 1-9: 0
1. LITTLE INTEREST OR PLEASURE IN DOING THINGS: NOT AT ALL

## 2024-07-08 NOTE — PROGRESS NOTES
HISTORY OF PRESENT ILLNESS  Cata Chaudhry is a 59 y.o. female.  HPI  Last here 1/2/24.  Pt is here to f/u on chronic conditions.            Has a history of hypertension  BP today is  No home BP readings for review   Continues on HCTZ 25mg, Toprol 25 mg, and norvasc 5 mg, and losartan 50 mg daily       Wt today is lbs,  lbs x lov 182  Discussed diet and wt loss   She is working on diet, has cut back on sodas, sugar and salt     Reviewed labs   Ordered labs    Lov states she has been struggling with sciatica recently x 1 month  She reports back stiffness/pinching sensation, mostly bothers her at night   Using lidocaine cream on her back but this is not helping   She has not been taking gabapentin as she ran out will refill  She will see Dr Muse's PA today     She also c/o R knee pain   Referred to Dr Crocekr (ortho)            Recall ECHO 4/18/17: heart squeeze nl, no MR  Discussed we will repeat if progressive murmur--ordered repeat echocardiogram     Pt is on klor con 20 meq TID     Pt follows with Dr. Spangler (ENT) prn  Last visit was 4/2017  Pt will only f/u with this physician prn   No issues     Pt previously followed with Dr. 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 Null (derm) for routine skin checks  Pt was provided with a cream, which improved her dry skin   Pt has not seen this physician recently      Pt is taking effexor 37.5mg for hot flashes and mood swings per her gyn, working well     Has a hx of back and hip pain symptoms  She saw Dr. Muse (ortho) for her back previously but not recently  Last there 10/20 - she was given methocarbamol  Last imaging in 2020  She is taking gabapentin prn ran out needs a refill which I ordered     She is taking vit c, vit e, fish oil, iron, and zinc     Continues on zyrtec daily for allergies      Continues on vit D OTC 25 mcg daily        PREVENTIVE:  
\"Have you been to the ER, urgent care clinic since your last visit?  Hospitalized since your last visit?\"    NO    “Have you seen or consulted any other health care providers outside of [facility] since your last visit?”    NO            Click Here for Release of Records Request  
and norvasc 5 mg, and losartan 50 mg daily  Ferritin    Hemoglobin A1C   Well-controlled continue current dose no changes check metabolic panel for K creatinine sodium levels   2. Dyslipidemia  Comprehensive Metabolic Panel    CBC   On Lipitor controlled monitor lipids Ferritin    Hemoglobin A1C      3. Severe obesity (HCC)     Work on portions   4. IFG (impaired fasting glucose)  Comprehensive Metabolic Panel    CBC   Check A1c diet controlled Ferritin    Hemoglobin A1C      5. Hypokalemia     On Klor-Con 2 of these per day check level adjust dose as needed   6. Murmur     No significant murmur today we will hold off on repeat echo   7. Microcytosis  Comprehensive Metabolic Panel    CBC   Check CBC to ensure stable no progressive anemia Ferritin    Hemoglobin A1C      8. Sciatica, right side     Improved with gabapentin takes 200 mg daily continue   9. Chronic pain of right knee     Needs referral for orthopedics again printed out information   Hot flashes controlled on Effexor continue no change to dose      I have reviewed the note documented by the scribe.  The services provided are my own.  The documentation is accurate     Depression screen reviewed and negative.  Scribed by Carlos Eduardo Zee, as dictated by Dr. Eleanor Hollis.    Current diagnosis and concerns discussed with pt at length. Pt understands risks and benefits or current treatment plan and medications, and accepts the treatment and medication with any possible risks. Pt asks appropriate questions, which were answered. Pt was instructed to call with any concerns or problems.    I have reviewed the note documented by the scribe. The services provided are my own. The documentation is accurate.

## 2024-07-09 LAB
ALBUMIN SERPL-MCNC: 4.2 G/DL (ref 3.5–5)
ALBUMIN/GLOB SERPL: 1.2 (ref 1.1–2.2)
ALP SERPL-CCNC: 83 U/L (ref 45–117)
ALT SERPL-CCNC: 30 U/L (ref 12–78)
ANION GAP SERPL CALC-SCNC: 5 MMOL/L (ref 5–15)
AST SERPL-CCNC: 16 U/L (ref 15–37)
BILIRUB SERPL-MCNC: 0.3 MG/DL (ref 0.2–1)
BUN SERPL-MCNC: 12 MG/DL (ref 6–20)
BUN/CREAT SERPL: 13 (ref 12–20)
CALCIUM SERPL-MCNC: 9.5 MG/DL (ref 8.5–10.1)
CHLORIDE SERPL-SCNC: 108 MMOL/L (ref 97–108)
CO2 SERPL-SCNC: 29 MMOL/L (ref 21–32)
CREAT SERPL-MCNC: 0.89 MG/DL (ref 0.55–1.02)
ERYTHROCYTE [DISTWIDTH] IN BLOOD BY AUTOMATED COUNT: 16.9 % (ref 11.5–14.5)
EST. AVERAGE GLUCOSE BLD GHB EST-MCNC: 108 MG/DL
FERRITIN SERPL-MCNC: 253 NG/ML (ref 8–252)
GLOBULIN SER CALC-MCNC: 3.4 G/DL (ref 2–4)
GLUCOSE SERPL-MCNC: 84 MG/DL (ref 65–100)
HBA1C MFR BLD: 5.4 % (ref 4–5.6)
HCT VFR BLD AUTO: 38.1 % (ref 35–47)
HGB BLD-MCNC: 11.2 G/DL (ref 11.5–16)
MCH RBC QN AUTO: 22.5 PG (ref 26–34)
MCHC RBC AUTO-ENTMCNC: 29.4 G/DL (ref 30–36.5)
MCV RBC AUTO: 76.7 FL (ref 80–99)
NRBC # BLD: 0 K/UL (ref 0–0.01)
NRBC BLD-RTO: 0 PER 100 WBC
PLATELET # BLD AUTO: 320 K/UL (ref 150–400)
PMV BLD AUTO: 9.6 FL (ref 8.9–12.9)
POTASSIUM SERPL-SCNC: 3.8 MMOL/L (ref 3.5–5.1)
PROT SERPL-MCNC: 7.6 G/DL (ref 6.4–8.2)
RBC # BLD AUTO: 4.97 M/UL (ref 3.8–5.2)
SODIUM SERPL-SCNC: 142 MMOL/L (ref 136–145)
WBC # BLD AUTO: 8 K/UL (ref 3.6–11)

## 2024-07-16 RX ORDER — AMLODIPINE BESYLATE 5 MG/1
5 TABLET ORAL DAILY
Qty: 90 TABLET | Refills: 0 | Status: SHIPPED | OUTPATIENT
Start: 2024-07-16

## 2024-07-29 RX ORDER — LOSARTAN POTASSIUM 50 MG/1
50 TABLET ORAL DAILY
Qty: 90 TABLET | Refills: 0 | Status: SHIPPED | OUTPATIENT
Start: 2024-07-29

## 2024-09-30 RX ORDER — VENLAFAXINE HYDROCHLORIDE 37.5 MG/1
37.5 CAPSULE, EXTENDED RELEASE ORAL DAILY
Qty: 90 CAPSULE | Refills: 0 | Status: SHIPPED | OUTPATIENT
Start: 2024-09-30

## 2024-09-30 RX ORDER — METOPROLOL SUCCINATE 25 MG/1
25 TABLET, EXTENDED RELEASE ORAL DAILY
Qty: 90 TABLET | Refills: 0 | Status: SHIPPED | OUTPATIENT
Start: 2024-09-30

## 2024-10-10 RX ORDER — AMLODIPINE BESYLATE 5 MG/1
5 TABLET ORAL DAILY
Qty: 90 TABLET | Refills: 0 | Status: SHIPPED | OUTPATIENT
Start: 2024-10-10

## 2024-11-05 RX ORDER — LOSARTAN POTASSIUM 50 MG/1
50 TABLET ORAL DAILY
Qty: 90 TABLET | Refills: 0 | Status: SHIPPED | OUTPATIENT
Start: 2024-11-05

## 2024-11-22 RX ORDER — HYDROCHLOROTHIAZIDE 25 MG/1
25 TABLET ORAL DAILY
Qty: 90 TABLET | Refills: 0 | Status: SHIPPED | OUTPATIENT
Start: 2024-11-22

## 2024-12-30 RX ORDER — METOPROLOL SUCCINATE 25 MG/1
25 TABLET, EXTENDED RELEASE ORAL DAILY
Qty: 30 TABLET | Refills: 0 | Status: SHIPPED | OUTPATIENT
Start: 2024-12-30

## 2024-12-30 RX ORDER — VENLAFAXINE HYDROCHLORIDE 37.5 MG/1
37.5 CAPSULE, EXTENDED RELEASE ORAL DAILY
Qty: 90 CAPSULE | Refills: 0 | Status: SHIPPED | OUTPATIENT
Start: 2024-12-30

## 2025-01-09 RX ORDER — AMLODIPINE BESYLATE 5 MG/1
5 TABLET ORAL DAILY
Qty: 30 TABLET | Refills: 0 | Status: SHIPPED | OUTPATIENT
Start: 2025-01-09

## 2025-01-10 NOTE — PROGRESS NOTES
HISTORY OF PRESENT ILLNESS  Cata Chaudhry is a 59 y.o. female.  HPI  Last here 7/8/24.  Pt is here to f/u on chronic conditions.          Has a history of hypertension  BP today is 139/79 initial blood pressure little higher  home BP: 148/89 156/98 143/95 158/79 144/62 156/98 142/83 149/68 162/87   Continues on HCTZ 25mg, Toprol 25 mg, and norvasc 5 mg, and losartan 50 mg daily   Will increase losartan to 100 mg daily      Wt today is 184 lbs, lov 184 lbs  Discussed diet and wt loss   She is working on diet, has cut back on sodas, sugar and salt        Reviewed labs   Ordered labs           She also c/o R knee pain chronic wanted to see orthopedics previously  Referred to Dr Crocker (ortho) previously         Recall ECHO 4/18/17: heart squeeze nl, no MR  Discussed we will repeat if progressive murmur--minimal today     Pt is on klor con 20 meq BID     Pt follows with Dr. Spangler (ENT) prn  Last visit was 4/2017  Pt will only f/u with this physician prn   No issues     Pt previously followed with Dr. 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 Null (derm) for routine skin checks  Pt was provided with a cream, which improved her dry skin   Pt has not seen this physician recently      Pt is taking effexor 37.5mg for hot flashes and mood swings per her gyn, working well 1/25     Has a hx of back and hip pain symptoms  She saw Dr. Muse (ortho) for her back previously but not recently  Last there 10/20 - she was given methocarbamol  Last imaging in 2020  She is taking gabapentin 200 mg nightly works well        Patient is on Lipitor 20 mg daily        She is taking vit c, vit e, fish oil, iron, and zinc     Continues on zyrtec daily for allergies      Continues on vit D OTC 25 mcg daily         PREVENTIVE:    Colonoscopy: 5/21 Dr. Locke, 5 year repeat per pt  Pap: Dr. Barker [facility], 11/24  Mammogram: 11/23, negative

## 2025-01-14 ENCOUNTER — OFFICE VISIT (OUTPATIENT)
Age: 60
End: 2025-01-14
Payer: COMMERCIAL

## 2025-01-14 VITALS
HEIGHT: 62 IN | BODY MASS INDEX: 33.69 KG/M2 | SYSTOLIC BLOOD PRESSURE: 139 MMHG | HEART RATE: 65 BPM | DIASTOLIC BLOOD PRESSURE: 79 MMHG | RESPIRATION RATE: 18 BRPM | OXYGEN SATURATION: 99 %

## 2025-01-14 DIAGNOSIS — R73.01 IFG (IMPAIRED FASTING GLUCOSE): ICD-10-CM

## 2025-01-14 DIAGNOSIS — I10 PRIMARY HYPERTENSION: ICD-10-CM

## 2025-01-14 DIAGNOSIS — Z23 NEEDS FLU SHOT: ICD-10-CM

## 2025-01-14 DIAGNOSIS — Z00.00 PHYSICAL EXAM: Primary | ICD-10-CM

## 2025-01-14 DIAGNOSIS — M54.31 BILATERAL SCIATICA: ICD-10-CM

## 2025-01-14 DIAGNOSIS — E78.5 DYSLIPIDEMIA: ICD-10-CM

## 2025-01-14 DIAGNOSIS — E87.6 HYPOKALEMIA: ICD-10-CM

## 2025-01-14 DIAGNOSIS — M54.32 BILATERAL SCIATICA: ICD-10-CM

## 2025-01-14 DIAGNOSIS — R23.2 HOT FLASHES: ICD-10-CM

## 2025-01-14 PROCEDURE — 3078F DIAST BP <80 MM HG: CPT | Performed by: INTERNAL MEDICINE

## 2025-01-14 PROCEDURE — 90471 IMMUNIZATION ADMIN: CPT | Performed by: INTERNAL MEDICINE

## 2025-01-14 PROCEDURE — 90661 CCIIV3 VAC ABX FR 0.5 ML IM: CPT | Performed by: INTERNAL MEDICINE

## 2025-01-14 PROCEDURE — 3075F SYST BP GE 130 - 139MM HG: CPT | Performed by: INTERNAL MEDICINE

## 2025-01-14 PROCEDURE — 99396 PREV VISIT EST AGE 40-64: CPT | Performed by: INTERNAL MEDICINE

## 2025-01-14 RX ORDER — LOSARTAN POTASSIUM 100 MG/1
100 TABLET ORAL DAILY
Qty: 90 TABLET | Refills: 1 | Status: SHIPPED | OUTPATIENT
Start: 2025-01-14

## 2025-01-14 SDOH — ECONOMIC STABILITY: FOOD INSECURITY: WITHIN THE PAST 12 MONTHS, YOU WORRIED THAT YOUR FOOD WOULD RUN OUT BEFORE YOU GOT MONEY TO BUY MORE.: NEVER TRUE

## 2025-01-14 SDOH — ECONOMIC STABILITY: FOOD INSECURITY: WITHIN THE PAST 12 MONTHS, THE FOOD YOU BOUGHT JUST DIDN'T LAST AND YOU DIDN'T HAVE MONEY TO GET MORE.: NEVER TRUE

## 2025-01-14 ASSESSMENT — PATIENT HEALTH QUESTIONNAIRE - PHQ9
SUM OF ALL RESPONSES TO PHQ QUESTIONS 1-9: 0
SUM OF ALL RESPONSES TO PHQ QUESTIONS 1-9: 0
2. FEELING DOWN, DEPRESSED OR HOPELESS: NOT AT ALL
1. LITTLE INTEREST OR PLEASURE IN DOING THINGS: NOT AT ALL
SUM OF ALL RESPONSES TO PHQ9 QUESTIONS 1 & 2: 0
SUM OF ALL RESPONSES TO PHQ QUESTIONS 1-9: 0
SUM OF ALL RESPONSES TO PHQ QUESTIONS 1-9: 0

## 2025-01-15 LAB
BUN SERPL-MCNC: 18 MG/DL (ref 8–27)
BUN/CREAT SERPL: 23 (ref 12–28)
CALCIUM SERPL-MCNC: 9.9 MG/DL (ref 8.7–10.3)
CHLORIDE SERPL-SCNC: 101 MMOL/L (ref 96–106)
CHOLEST SERPL-MCNC: 172 MG/DL (ref 100–199)
CO2 SERPL-SCNC: 27 MMOL/L (ref 20–29)
CREAT SERPL-MCNC: 0.77 MG/DL (ref 0.57–1)
EGFRCR SERPLBLD CKD-EPI 2021: 88 ML/MIN/1.73
GLUCOSE SERPL-MCNC: 90 MG/DL (ref 70–99)
HBA1C MFR BLD: 5.8 % (ref 4.8–5.6)
HDLC SERPL-MCNC: 68 MG/DL
LDLC SERPL CALC-MCNC: 91 MG/DL (ref 0–99)
POTASSIUM SERPL-SCNC: 4.2 MMOL/L (ref 3.5–5.2)
SODIUM SERPL-SCNC: 143 MMOL/L (ref 134–144)
TRIGL SERPL-MCNC: 71 MG/DL (ref 0–149)
TSH SERPL DL<=0.005 MIU/L-ACNC: 2.53 UIU/ML (ref 0.45–4.5)
VLDLC SERPL CALC-MCNC: 13 MG/DL (ref 5–40)

## 2025-01-30 RX ORDER — METOPROLOL SUCCINATE 25 MG/1
25 TABLET, EXTENDED RELEASE ORAL DAILY
Qty: 90 TABLET | Refills: 0 | Status: SHIPPED | OUTPATIENT
Start: 2025-01-30

## 2025-02-04 RX ORDER — AMLODIPINE BESYLATE 5 MG/1
5 TABLET ORAL DAILY
Qty: 30 TABLET | Refills: 0 | Status: SHIPPED | OUTPATIENT
Start: 2025-02-04 | End: 2025-03-02

## 2025-02-05 NOTE — TELEPHONE ENCOUNTER
Spoke to patient and offered to schedule an appointment in July. Patient states will call the office back to schedule.

## 2025-02-16 RX ORDER — HYDROCHLOROTHIAZIDE 25 MG/1
25 TABLET ORAL DAILY
Qty: 90 TABLET | Refills: 0 | Status: SHIPPED | OUTPATIENT
Start: 2025-02-16

## 2025-02-25 NOTE — TELEPHONE ENCOUNTER
2nd attempt:   Left generic message. Attempted to schedule a 6 month f/up around 7/14/25.   Mailing letter

## 2025-03-02 RX ORDER — AMLODIPINE BESYLATE 5 MG/1
5 TABLET ORAL DAILY
Qty: 30 TABLET | Refills: 0 | Status: SHIPPED | OUTPATIENT
Start: 2025-03-02

## 2025-03-03 DIAGNOSIS — I10 PRIMARY HYPERTENSION: ICD-10-CM

## 2025-03-03 DIAGNOSIS — E66.01 SEVERE OBESITY: ICD-10-CM

## 2025-03-03 DIAGNOSIS — E78.5 DYSLIPIDEMIA: ICD-10-CM

## 2025-03-03 DIAGNOSIS — R73.01 IFG (IMPAIRED FASTING GLUCOSE): ICD-10-CM

## 2025-03-03 RX ORDER — GABAPENTIN 100 MG/1
100 CAPSULE ORAL 2 TIMES DAILY PRN
Qty: 180 CAPSULE | Refills: 0 | Status: SHIPPED | OUTPATIENT
Start: 2025-03-03 | End: 2025-06-01

## 2025-03-03 RX ORDER — POTASSIUM CHLORIDE 1500 MG/1
20 TABLET, EXTENDED RELEASE ORAL 2 TIMES DAILY
Qty: 180 TABLET | Refills: 1 | Status: SHIPPED | OUTPATIENT
Start: 2025-03-03

## 2025-03-03 NOTE — TELEPHONE ENCOUNTER
Called, Spoke with Pt  Received two pt identifiers  Pt informed per Dr. Hollis at [facility] and labs she is supposed to stay taking Klor-Con 20MEQ BID  Pt states needs a refill of that as well--pended  Pt verbalizes understanding of the instructions and has no further questions at this time.

## 2025-03-03 NOTE — TELEPHONE ENCOUNTER
Spoke to patient and informed them they need to schedule a July appointment. Patient states will call the office back to schedule.

## 2025-03-03 NOTE — TELEPHONE ENCOUNTER
Patient requesting a refill for gabapentin (NEURONTIN) 100 MG capsule [[id number]]  ENDED       Patient asking if they are suppose to be taking potassium chloride (KLOR-CON M) 20 MEQ extended release tablet [[id number]]         Please advise patient

## 2025-03-31 RX ORDER — VENLAFAXINE HYDROCHLORIDE 37.5 MG/1
37.5 CAPSULE, EXTENDED RELEASE ORAL DAILY
Qty: 90 CAPSULE | Refills: 0 | Status: SHIPPED | OUTPATIENT
Start: 2025-03-31

## 2025-04-01 RX ORDER — AMLODIPINE BESYLATE 5 MG/1
5 TABLET ORAL DAILY
Qty: 30 TABLET | Refills: 0 | Status: SHIPPED | OUTPATIENT
Start: 2025-04-01

## 2025-05-04 RX ORDER — AMLODIPINE BESYLATE 5 MG/1
5 TABLET ORAL DAILY
Qty: 90 TABLET | Refills: 0 | Status: SHIPPED | OUTPATIENT
Start: 2025-05-04

## 2025-07-03 RX ORDER — VENLAFAXINE HYDROCHLORIDE 37.5 MG/1
37.5 CAPSULE, EXTENDED RELEASE ORAL DAILY
Qty: 90 CAPSULE | Refills: 0 | Status: SHIPPED | OUTPATIENT
Start: 2025-07-03

## 2025-07-06 RX ORDER — METOPROLOL SUCCINATE 25 MG/1
25 TABLET, EXTENDED RELEASE ORAL DAILY
Qty: 30 TABLET | Refills: 0 | Status: SHIPPED | OUTPATIENT
Start: 2025-07-06

## 2025-07-15 RX ORDER — LOSARTAN POTASSIUM 100 MG/1
100 TABLET ORAL DAILY
Qty: 30 TABLET | Refills: 0 | Status: SHIPPED | OUTPATIENT
Start: 2025-07-15

## 2025-08-01 RX ORDER — METOPROLOL SUCCINATE 25 MG/1
25 TABLET, EXTENDED RELEASE ORAL DAILY
Qty: 30 TABLET | Refills: 0 | Status: SHIPPED | OUTPATIENT
Start: 2025-08-01

## 2025-08-18 ENCOUNTER — HOSPITAL ENCOUNTER (OUTPATIENT)
Facility: HOSPITAL | Age: 60
Discharge: HOME | End: 2025-08-21
Attending: INTERNAL MEDICINE
Payer: COMMERCIAL

## 2025-08-18 DIAGNOSIS — Z00.00 PHYSICAL EXAM: ICD-10-CM

## 2025-08-18 PROCEDURE — 77063 BREAST TOMOSYNTHESIS BI: CPT

## 2025-08-20 ENCOUNTER — OFFICE VISIT (OUTPATIENT)
Age: 60
End: 2025-08-20
Payer: COMMERCIAL

## 2025-08-20 VITALS
BODY MASS INDEX: 33.95 KG/M2 | TEMPERATURE: 97.4 F | HEART RATE: 64 BPM | DIASTOLIC BLOOD PRESSURE: 86 MMHG | HEIGHT: 62 IN | WEIGHT: 184.5 LBS | SYSTOLIC BLOOD PRESSURE: 138 MMHG | OXYGEN SATURATION: 98 %

## 2025-08-20 DIAGNOSIS — R23.2 HOT FLASHES: ICD-10-CM

## 2025-08-20 DIAGNOSIS — E87.6 HYPOKALEMIA: ICD-10-CM

## 2025-08-20 DIAGNOSIS — R06.83 SNORING: ICD-10-CM

## 2025-08-20 DIAGNOSIS — I10 PRIMARY HYPERTENSION: Primary | ICD-10-CM

## 2025-08-20 DIAGNOSIS — Z23 NEED FOR PROPHYLACTIC VACCINATION AGAINST STREPTOCOCCUS PNEUMONIAE (PNEUMOCOCCUS): ICD-10-CM

## 2025-08-20 DIAGNOSIS — E78.5 DYSLIPIDEMIA: ICD-10-CM

## 2025-08-20 DIAGNOSIS — R51.9 CHRONIC NONINTRACTABLE HEADACHE, UNSPECIFIED HEADACHE TYPE: ICD-10-CM

## 2025-08-20 DIAGNOSIS — R73.01 IFG (IMPAIRED FASTING GLUCOSE): ICD-10-CM

## 2025-08-20 DIAGNOSIS — G89.29 CHRONIC NONINTRACTABLE HEADACHE, UNSPECIFIED HEADACHE TYPE: ICD-10-CM

## 2025-08-20 DIAGNOSIS — M25.551 HIP PAIN, RIGHT: ICD-10-CM

## 2025-08-20 DIAGNOSIS — E66.01 SEVERE OBESITY (HCC): ICD-10-CM

## 2025-08-20 PROCEDURE — 90677 PCV20 VACCINE IM: CPT | Performed by: INTERNAL MEDICINE

## 2025-08-20 PROCEDURE — 3079F DIAST BP 80-89 MM HG: CPT | Performed by: INTERNAL MEDICINE

## 2025-08-20 PROCEDURE — 99214 OFFICE O/P EST MOD 30 MIN: CPT | Performed by: INTERNAL MEDICINE

## 2025-08-20 PROCEDURE — 90471 IMMUNIZATION ADMIN: CPT | Performed by: INTERNAL MEDICINE

## 2025-08-20 PROCEDURE — 3075F SYST BP GE 130 - 139MM HG: CPT | Performed by: INTERNAL MEDICINE

## 2025-08-20 RX ORDER — AMLODIPINE BESYLATE 10 MG/1
10 TABLET ORAL DAILY
Qty: 30 TABLET | Refills: 3 | Status: SHIPPED | OUTPATIENT
Start: 2025-08-20

## 2025-08-20 RX ORDER — ATORVASTATIN CALCIUM 20 MG/1
10 TABLET, FILM COATED ORAL DAILY
Qty: 90 TABLET | Refills: 1 | Status: SHIPPED | OUTPATIENT
Start: 2025-08-20

## 2025-08-20 RX ORDER — LOSARTAN POTASSIUM 100 MG/1
100 TABLET ORAL DAILY
Qty: 90 TABLET | Refills: 1 | Status: SHIPPED | OUTPATIENT
Start: 2025-08-20

## 2025-08-20 ASSESSMENT — PATIENT HEALTH QUESTIONNAIRE - PHQ9
SUM OF ALL RESPONSES TO PHQ QUESTIONS 1-9: 0
2. FEELING DOWN, DEPRESSED OR HOPELESS: NOT AT ALL
1. LITTLE INTEREST OR PLEASURE IN DOING THINGS: NOT AT ALL
SUM OF ALL RESPONSES TO PHQ QUESTIONS 1-9: 0

## 2025-08-21 LAB
ALBUMIN SERPL-MCNC: 4.5 G/DL (ref 3.9–4.9)
ALP SERPL-CCNC: 103 IU/L (ref 44–121)
ALT SERPL-CCNC: 19 IU/L (ref 0–32)
AST SERPL-CCNC: 18 IU/L (ref 0–40)
BILIRUB SERPL-MCNC: 0.3 MG/DL (ref 0–1.2)
BUN SERPL-MCNC: 12 MG/DL (ref 8–27)
BUN/CREAT SERPL: 15 (ref 12–28)
CALCIUM SERPL-MCNC: 9.8 MG/DL (ref 8.7–10.3)
CHLORIDE SERPL-SCNC: 100 MMOL/L (ref 96–106)
CO2 SERPL-SCNC: 26 MMOL/L (ref 20–29)
CREAT SERPL-MCNC: 0.82 MG/DL (ref 0.57–1)
EGFRCR SERPLBLD CKD-EPI 2021: 81 ML/MIN/1.73
ERYTHROCYTE [DISTWIDTH] IN BLOOD BY AUTOMATED COUNT: 14.9 % (ref 11.7–15.4)
EST. AVERAGE GLUCOSE BLD GHB EST-MCNC: 120 MG/DL
GLOBULIN SER CALC-MCNC: 2.6 G/DL (ref 1.5–4.5)
GLUCOSE SERPL-MCNC: 88 MG/DL (ref 70–99)
HBA1C MFR BLD: 5.8 % (ref 4.8–5.6)
HCT VFR BLD AUTO: 39.3 % (ref 34–46.6)
HGB BLD-MCNC: 11.3 G/DL (ref 11.1–15.9)
MCH RBC QN AUTO: 22.2 PG (ref 26.6–33)
MCHC RBC AUTO-ENTMCNC: 28.8 G/DL (ref 31.5–35.7)
MCV RBC AUTO: 77 FL (ref 79–97)
PLATELET # BLD AUTO: 353 X10E3/UL (ref 150–450)
POTASSIUM SERPL-SCNC: 4.1 MMOL/L (ref 3.5–5.2)
PROT SERPL-MCNC: 7.1 G/DL (ref 6–8.5)
RBC # BLD AUTO: 5.1 X10E6/UL (ref 3.77–5.28)
SODIUM SERPL-SCNC: 139 MMOL/L (ref 134–144)
WBC # BLD AUTO: 9.3 X10E3/UL (ref 3.4–10.8)

## 2025-08-25 RX ORDER — POTASSIUM CHLORIDE 1500 MG/1
20 TABLET, EXTENDED RELEASE ORAL 2 TIMES DAILY
Qty: 180 TABLET | Refills: 0 | Status: SHIPPED | OUTPATIENT
Start: 2025-08-25

## 2025-09-02 ENCOUNTER — APPOINTMENT (OUTPATIENT)
Facility: HOSPITAL | Age: 60
End: 2025-09-02
Payer: COMMERCIAL

## 2025-09-02 ENCOUNTER — HOSPITAL ENCOUNTER (EMERGENCY)
Facility: HOSPITAL | Age: 60
Discharge: HOME | End: 2025-09-02
Payer: COMMERCIAL

## 2025-09-02 VITALS
TEMPERATURE: 98.1 F | HEART RATE: 76 BPM | HEIGHT: 61 IN | SYSTOLIC BLOOD PRESSURE: 148 MMHG | DIASTOLIC BLOOD PRESSURE: 87 MMHG | WEIGHT: 185 LBS | BODY MASS INDEX: 34.93 KG/M2 | RESPIRATION RATE: 17 BRPM | OXYGEN SATURATION: 99 %

## 2025-09-02 DIAGNOSIS — S89.92XA INJURY OF LEFT KNEE, INITIAL ENCOUNTER: Primary | ICD-10-CM

## 2025-09-02 PROCEDURE — 99283 EMERGENCY DEPT VISIT LOW MDM: CPT

## 2025-09-02 PROCEDURE — 73562 X-RAY EXAM OF KNEE 3: CPT

## 2025-09-02 ASSESSMENT — PAIN SCALES - GENERAL: PAINLEVEL_OUTOF10: 7

## 2025-09-03 RX ORDER — METOPROLOL SUCCINATE 25 MG/1
25 TABLET, EXTENDED RELEASE ORAL DAILY
Qty: 90 TABLET | Refills: 1 | Status: SHIPPED | OUTPATIENT
Start: 2025-09-03

## 2025-09-03 ASSESSMENT — ENCOUNTER SYMPTOMS
SHORTNESS OF BREATH: 0
NAUSEA: 0
COLOR CHANGE: 0
COUGH: 0
ABDOMINAL PAIN: 0
SORE THROAT: 0
VOMITING: 0